# Patient Record
Sex: FEMALE | Race: WHITE | Employment: FULL TIME | URBAN - METROPOLITAN AREA
[De-identification: names, ages, dates, MRNs, and addresses within clinical notes are randomized per-mention and may not be internally consistent; named-entity substitution may affect disease eponyms.]

---

## 2017-02-23 ENCOUNTER — APPOINTMENT (OUTPATIENT)
Dept: GENERAL RADIOLOGY | Age: 27
End: 2017-02-23
Attending: NURSE PRACTITIONER
Payer: COMMERCIAL

## 2017-02-23 ENCOUNTER — HOSPITAL ENCOUNTER (OUTPATIENT)
Age: 27
Discharge: HOME | End: 2017-02-23
Payer: COMMERCIAL

## 2017-02-23 VITALS
HEART RATE: 102 BPM | TEMPERATURE: 99 F | SYSTOLIC BLOOD PRESSURE: 136 MMHG | WEIGHT: 165 LBS | RESPIRATION RATE: 20 BRPM | OXYGEN SATURATION: 98 % | HEIGHT: 67 IN | BODY MASS INDEX: 25.9 KG/M2 | DIASTOLIC BLOOD PRESSURE: 84 MMHG

## 2017-02-23 DIAGNOSIS — J18.9 COMMUNITY ACQUIRED PNEUMONIA: Primary | ICD-10-CM

## 2017-02-23 PROCEDURE — 71020 XR CHEST PA + LAT CHEST (CPT=71020): CPT

## 2017-02-23 PROCEDURE — 99214 OFFICE O/P EST MOD 30 MIN: CPT

## 2017-02-23 PROCEDURE — 99213 OFFICE O/P EST LOW 20 MIN: CPT

## 2017-02-23 RX ORDER — CODEINE PHOSPHATE AND GUAIFENESIN 10; 100 MG/5ML; MG/5ML
5 SOLUTION ORAL EVERY 6 HOURS PRN
Qty: 118 ML | Refills: 0 | Status: SHIPPED | OUTPATIENT
Start: 2017-02-23 | End: 2017-02-28

## 2017-02-23 RX ORDER — AZITHROMYCIN 250 MG/1
TABLET, FILM COATED ORAL
Qty: 1 PACKAGE | Refills: 0 | Status: SHIPPED | OUTPATIENT
Start: 2017-02-23 | End: 2017-02-28

## 2017-02-23 NOTE — ED PROVIDER NOTES
Patient Seen in: [facility] In [place]    History   CC: cough  HPI: Eva SILVERMAN 32year old female  who presents to the ER c/o cough, low-grade fever, generalized fatigue and myalgias, midsternal chest discomfort assoc noted, no periorbital edema  ENT - EAC bilaterally without discharge, TM pearly grey with COL visualized appropriately bilaterally.    No pain with palpation to frontal or maxillary sinuses, + clear nasal drainage noted in nares bilat, no cobblestoning to p

## 2017-02-23 NOTE — ED INITIAL ASSESSMENT (HPI)
REPORTS COUGH STARTING Monday, WHICH HAS GOTTEN PROGRESSIVELY WORSE.  + FEVER, CHILLS, BODY ACHES AND NAUSEA. REPORTS MID STERNAL \"PRESSURE. \"  REPORTS COUGH IS PRODUCTIVE WITH GREEN SPUTUM. DENIES SINUS PRESSURE.

## 2017-09-08 ENCOUNTER — CHARTING TRANS (OUTPATIENT)
Dept: OTHER | Age: 27
End: 2017-09-08

## 2017-09-10 ENCOUNTER — CHARTING TRANS (OUTPATIENT)
Dept: OTHER | Age: 27
End: 2017-09-10

## 2017-12-23 ENCOUNTER — HOSPITAL ENCOUNTER (OUTPATIENT)
Age: 27
Discharge: HOME | End: 2017-12-23
Attending: EMERGENCY MEDICINE
Payer: COMMERCIAL

## 2017-12-23 VITALS
BODY MASS INDEX: 24.46 KG/M2 | OXYGEN SATURATION: 100 % | SYSTOLIC BLOOD PRESSURE: 121 MMHG | RESPIRATION RATE: 18 BRPM | DIASTOLIC BLOOD PRESSURE: 68 MMHG | HEART RATE: 79 BPM | WEIGHT: 154 LBS | TEMPERATURE: 98 F | HEIGHT: 66.6 IN

## 2017-12-23 DIAGNOSIS — J02.0 STREP PHARYNGITIS: Primary | ICD-10-CM

## 2017-12-23 PROCEDURE — 99213 OFFICE O/P EST LOW 20 MIN: CPT

## 2017-12-23 PROCEDURE — 99214 OFFICE O/P EST MOD 30 MIN: CPT

## 2017-12-23 PROCEDURE — 87430 STREP A AG IA: CPT

## 2017-12-23 RX ORDER — PENICILLIN V POTASSIUM 500 MG/1
500 TABLET ORAL 3 TIMES DAILY
Qty: 30 TABLET | Refills: 0 | Status: SHIPPED | OUTPATIENT
Start: 2017-12-23 | End: 2018-01-02

## 2017-12-23 NOTE — ED PROVIDER NOTES
Patient Seen in: [facility]    History   Patient presents with:  Sore Throat    Stated Complaint: Sore Throat    HPI    Patient is a 49-year-old female who presents to the urgent care with a chief complaint of sore throat RAPID STREP - Abnormal; Notable for the following:        Result Value    POCT Rapid Strep Positive (*)     All other components within normal limits       ED Course as of Dec 23 1111  ------------------------------------------------------------       MDM

## 2017-12-23 NOTE — ED INITIAL ASSESSMENT (HPI)
Reports bilateral ear pain and throat pain x 2 days. Denies fevers at home. States throat pain has somewhat improved but ear pain remains. Also reports bilateral muffled hearing.

## 2018-04-17 ENCOUNTER — HOSPITAL ENCOUNTER (EMERGENCY)
Facility: HOSPITAL | Age: 28
Discharge: HOME | End: 2018-04-17
Attending: EMERGENCY MEDICINE
Payer: COMMERCIAL

## 2018-04-17 VITALS
RESPIRATION RATE: 18 BRPM | OXYGEN SATURATION: 100 % | TEMPERATURE: 98 F | WEIGHT: 147 LBS | HEART RATE: 81 BPM | BODY MASS INDEX: 23.07 KG/M2 | SYSTOLIC BLOOD PRESSURE: 122 MMHG | HEIGHT: 67 IN | DIASTOLIC BLOOD PRESSURE: 71 MMHG

## 2018-04-17 DIAGNOSIS — R10.84 ABDOMINAL PAIN, GENERALIZED: Primary | ICD-10-CM

## 2018-04-17 PROCEDURE — 81001 URINALYSIS AUTO W/SCOPE: CPT | Performed by: EMERGENCY MEDICINE

## 2018-04-17 PROCEDURE — 85025 COMPLETE CBC W/AUTO DIFF WBC: CPT | Performed by: EMERGENCY MEDICINE

## 2018-04-17 PROCEDURE — 80048 BASIC METABOLIC PNL TOTAL CA: CPT | Performed by: EMERGENCY MEDICINE

## 2018-04-17 PROCEDURE — 36415 COLL VENOUS BLD VENIPUNCTURE: CPT

## 2018-04-17 PROCEDURE — 99283 EMERGENCY DEPT VISIT LOW MDM: CPT

## 2018-04-17 RX ORDER — DICYCLOMINE HCL 20 MG
20 TABLET ORAL 4 TIMES DAILY PRN
Qty: 30 TABLET | Refills: 0 | Status: SHIPPED | OUTPATIENT
Start: 2018-04-17 | End: 2019-10-28 | Stop reason: ALTCHOICE

## 2018-04-18 NOTE — ED INITIAL ASSESSMENT (HPI)
llq pain then throughout x 3 wk as well as upper abd bloating. + nausea, normal br habits but states loss of appetite

## 2018-04-18 NOTE — ED NOTES
Patient present with her  c/o LLQ pain noticed for 2-3 weeks with increased pain today. Patients admits to vaginal spotting. Pt denies any painful urination, frequency and urgency, no vomiting, and no fever. Positive nausea with pain.  Patient is niecy

## 2018-04-18 NOTE — ED PROVIDER NOTES
Patient Seen in: [facility] Emergency Department    History   Patient presents with:  Abdomen/Flank Pain (GI/)      HPI    Patient presents to the ED complaining of generalized abdominal pain over the past several months.   She states pain occasi appears well-developed and well-nourished. No distress. HENT:   Head: Normocephalic and atraumatic. Eyes: Conjunctivae are normal. Right eye exhibits no discharge. Left eye exhibits no discharge. Neck: No tracheal deviation present.    Cardiovascular: GOLD   RAINBOW DRAW LAVENDER TALL (BNP)         Imaging Results Available and Reviewed while in ED:     ED Medications Administered: Medications - No data to display      [facility]      04/17/18 1920 04/17/18 1922 04/17/18 2055   BP:  136/68 122/71   Pulse: 8 medications    Dicyclomine HCl 20 MG Oral Tab  Take 1 tablet (20 mg total) by mouth 4 (four) times daily as needed (Abdominal pain). , Print Script, Disp-30 tablet, R-0

## 2018-05-14 PROBLEM — R14.0 ABDOMINAL BLOATING: Status: ACTIVE | Noted: 2018-05-14

## 2018-05-14 PROBLEM — R10.32 LLQ PAIN: Status: ACTIVE | Noted: 2018-05-14

## 2018-05-14 PROCEDURE — 82784 ASSAY IGA/IGD/IGG/IGM EACH: CPT | Performed by: INTERNAL MEDICINE

## 2018-05-14 PROCEDURE — 36415 COLL VENOUS BLD VENIPUNCTURE: CPT | Performed by: INTERNAL MEDICINE

## 2018-05-14 PROCEDURE — 83516 IMMUNOASSAY NONANTIBODY: CPT | Performed by: INTERNAL MEDICINE

## 2018-11-03 VITALS
TEMPERATURE: 99.3 F | BODY MASS INDEX: 26.07 KG/M2 | OXYGEN SATURATION: 98 % | DIASTOLIC BLOOD PRESSURE: 70 MMHG | RESPIRATION RATE: 18 BRPM | WEIGHT: 166.1 LBS | SYSTOLIC BLOOD PRESSURE: 110 MMHG | HEART RATE: 85 BPM | HEIGHT: 67 IN

## 2018-12-18 ENCOUNTER — WALK IN (OUTPATIENT)
Dept: URGENT CARE | Age: 28
End: 2018-12-18

## 2018-12-18 ENCOUNTER — TELEPHONE (OUTPATIENT)
Dept: SCHEDULING | Age: 28
End: 2018-12-18

## 2018-12-18 VITALS
BODY MASS INDEX: 23.77 KG/M2 | RESPIRATION RATE: 16 BRPM | HEIGHT: 67 IN | WEIGHT: 151.46 LBS | HEART RATE: 76 BPM | DIASTOLIC BLOOD PRESSURE: 70 MMHG | SYSTOLIC BLOOD PRESSURE: 100 MMHG

## 2018-12-18 DIAGNOSIS — Z02.1 PRE-EMPLOYMENT EXAMINATION: Primary | ICD-10-CM

## 2018-12-18 PROCEDURE — X0945 SELF PAY APN OR PA PERFORMED ADMINISTRATIVE PHYSICAL: HCPCS | Performed by: NURSE PRACTITIONER

## 2019-01-23 ENCOUNTER — HOSPITAL ENCOUNTER (OUTPATIENT)
Age: 29
Discharge: HOME | End: 2019-01-23
Attending: EMERGENCY MEDICINE
Payer: COMMERCIAL

## 2019-01-23 VITALS
WEIGHT: 150 LBS | HEART RATE: 78 BPM | BODY MASS INDEX: 24 KG/M2 | DIASTOLIC BLOOD PRESSURE: 78 MMHG | TEMPERATURE: 98 F | SYSTOLIC BLOOD PRESSURE: 129 MMHG | OXYGEN SATURATION: 99 % | RESPIRATION RATE: 18 BRPM

## 2019-01-23 DIAGNOSIS — J02.0 STREPTOCOCCAL SORE THROAT: Primary | ICD-10-CM

## 2019-01-23 LAB — S PYO AG THROAT QL: POSITIVE

## 2019-01-23 PROCEDURE — 99214 OFFICE O/P EST MOD 30 MIN: CPT

## 2019-01-23 PROCEDURE — 87430 STREP A AG IA: CPT

## 2019-01-23 PROCEDURE — 99213 OFFICE O/P EST LOW 20 MIN: CPT

## 2019-01-23 RX ORDER — AMOXICILLIN 875 MG/1
875 TABLET, COATED ORAL 2 TIMES DAILY
Qty: 20 TABLET | Refills: 0 | Status: SHIPPED | OUTPATIENT
Start: 2019-01-23 | End: 2019-02-02

## 2019-01-23 NOTE — ED PROVIDER NOTES
Patient Seen in: 5 [facility]    History   Patient presents with:  Sore Throat    Stated Complaint: Sore Throat/Ears    HPI    The patient is a 59-year-old female with no significant past medical history presents now with s ×3.  The patient's motor strength is 5 out of 5 and symmetric in the upper and lower extremities bilaterally  Extremities: No focal swelling or tenderness  Skin: No pallor, no redness or warmth to the touch      ED Course     Labs Reviewed   EMH POCT RAPID

## 2019-05-24 ENCOUNTER — HOSPITAL ENCOUNTER (OUTPATIENT)
Age: 29
Discharge: HOME | End: 2019-05-24
Payer: COMMERCIAL

## 2019-05-24 VITALS
BODY MASS INDEX: 24.91 KG/M2 | SYSTOLIC BLOOD PRESSURE: 126 MMHG | DIASTOLIC BLOOD PRESSURE: 61 MMHG | HEIGHT: 66 IN | TEMPERATURE: 98 F | RESPIRATION RATE: 18 BRPM | WEIGHT: 155 LBS | HEART RATE: 62 BPM | OXYGEN SATURATION: 100 %

## 2019-05-24 DIAGNOSIS — H65.91 RIGHT NON-SUPPURATIVE OTITIS MEDIA: Primary | ICD-10-CM

## 2019-05-24 DIAGNOSIS — H65.193 ACUTE MEE (MIDDLE EAR EFFUSION), BILATERAL: ICD-10-CM

## 2019-05-24 PROCEDURE — 99213 OFFICE O/P EST LOW 20 MIN: CPT

## 2019-05-24 PROCEDURE — 99214 OFFICE O/P EST MOD 30 MIN: CPT

## 2019-05-24 RX ORDER — AMOXICILLIN 875 MG/1
875 TABLET, COATED ORAL 2 TIMES DAILY
Qty: 20 TABLET | Refills: 0 | Status: SHIPPED | OUTPATIENT
Start: 2019-05-24 | End: 2019-05-24

## 2019-05-24 RX ORDER — AMOXICILLIN 875 MG/1
875 TABLET, COATED ORAL 2 TIMES DAILY
Qty: 20 TABLET | Refills: 0 | Status: SHIPPED | OUTPATIENT
Start: 2019-05-24 | End: 2019-06-03

## 2019-05-24 RX ORDER — FLUCONAZOLE 150 MG/1
150 TABLET ORAL ONCE
Qty: 1 TABLET | Refills: 0 | Status: SHIPPED | OUTPATIENT
Start: 2019-05-24 | End: 2019-05-24

## 2019-05-24 NOTE — ED PROVIDER NOTES
Patient presents with:  Ear Problem Pain (neurosensory)      HPI:     Grayson Walsh is a 34year old female who presents with a chief complaint of bilateral ear fullness, discomfort, and a buzzing sound in the ears for the past couple weeks.   She state phone: Not on file        Gets together: Not on file        Attends Congregation service: Not on file        Active member of club or organization: Not on file        Attends meetings of clubs or organizations: Not on file        Relationship status: Not on f Refill:  0      Labs performed this visit:  No results found for this or any previous visit (from the past 10 hour(s)). MDM:  I will treat the patient for an otitis media with amoxicillin.   She states she tends to get a yeast vaginitis with antibi

## 2019-11-24 ENCOUNTER — HOSPITAL ENCOUNTER (OUTPATIENT)
Age: 29
Discharge: HOME | End: 2019-11-24
Payer: COMMERCIAL

## 2019-11-24 VITALS
RESPIRATION RATE: 16 BRPM | TEMPERATURE: 97 F | BODY MASS INDEX: 24 KG/M2 | DIASTOLIC BLOOD PRESSURE: 71 MMHG | HEART RATE: 93 BPM | SYSTOLIC BLOOD PRESSURE: 128 MMHG | WEIGHT: 150 LBS | OXYGEN SATURATION: 98 %

## 2019-11-24 DIAGNOSIS — J02.9 VIRAL PHARYNGITIS: Primary | ICD-10-CM

## 2019-11-24 PROCEDURE — 87430 STREP A AG IA: CPT

## 2019-11-24 PROCEDURE — 99212 OFFICE O/P EST SF 10 MIN: CPT

## 2019-11-24 PROCEDURE — 99213 OFFICE O/P EST LOW 20 MIN: CPT

## 2019-11-24 NOTE — ED INITIAL ASSESSMENT (HPI)
PATIENT ARRIVED AMBULATORY TO ROOM C/O SYMPTOMS X3 DAYS. +SORE THROAT. +INTERMITTENT DIARRHEA. +LEFT EAR PAIN. NO NASAL CONGESTION. NO COUGH. EASY NON LABORED RESPIRATIONS.  PATIENT STATES HER TEMP WAS 99 YESTERDAY

## 2019-11-24 NOTE — ED PROVIDER NOTES
Patient presents with:  Sore Throat      HPI:     Marti Jones is a 34year old female who presents for evaluation of a chief complaint of sore throat for the past 2 days. No difficulty swallowing. Speech is clear.   No difficulty breathing or strido of current or ex partner: Not on file        Emotionally abused: Not on file        Physically abused: Not on file        Forced sexual activity: Not on file    Other Topics      Concerns:        Not on file    Social History Narrative      Not on file

## 2020-05-04 PROBLEM — O30.001 TWIN GESTATION IN FIRST TRIMESTER: Status: ACTIVE | Noted: 2020-05-04

## 2020-05-04 PROBLEM — O30.001 TWIN GESTATION IN FIRST TRIMESTER (HCC): Status: ACTIVE | Noted: 2020-05-04

## 2020-07-28 PROBLEM — O30.049 DICHORIONIC DIAMNIOTIC TWIN PREGNANCY, ANTEPARTUM: Status: ACTIVE | Noted: 2020-07-28

## 2020-07-28 PROBLEM — O30.049 DICHORIONIC DIAMNIOTIC TWIN PREGNANCY, ANTEPARTUM (HCC): Status: ACTIVE | Noted: 2020-07-28

## 2020-09-12 ENCOUNTER — HOSPITAL ENCOUNTER (EMERGENCY)
Facility: HOSPITAL | Age: 30
Discharge: HOME | End: 2020-09-12
Payer: COMMERCIAL

## 2020-09-12 ENCOUNTER — HOSPITAL ENCOUNTER (OUTPATIENT)
Facility: HOSPITAL | Age: 30
Setting detail: OBSERVATION
Discharge: HOME | End: 2020-09-13
Attending: OBSTETRICS & GYNECOLOGY | Admitting: OBSTETRICS & GYNECOLOGY
Payer: COMMERCIAL

## 2020-09-12 VITALS
OXYGEN SATURATION: 96 % | TEMPERATURE: 98 F | HEART RATE: 100 BPM | SYSTOLIC BLOOD PRESSURE: 125 MMHG | WEIGHT: 175 LBS | RESPIRATION RATE: 18 BRPM | BODY MASS INDEX: 28.13 KG/M2 | HEIGHT: 66 IN | DIASTOLIC BLOOD PRESSURE: 56 MMHG

## 2020-09-12 VITALS
HEART RATE: 95 BPM | DIASTOLIC BLOOD PRESSURE: 78 MMHG | RESPIRATION RATE: 16 BRPM | TEMPERATURE: 98 F | SYSTOLIC BLOOD PRESSURE: 119 MMHG

## 2020-09-12 DIAGNOSIS — K59.00 CONSTIPATION, UNSPECIFIED CONSTIPATION TYPE: Primary | ICD-10-CM

## 2020-09-12 PROCEDURE — 99283 EMERGENCY DEPT VISIT LOW MDM: CPT

## 2020-09-12 RX ORDER — DEXTROSE, SODIUM CHLORIDE, SODIUM LACTATE, POTASSIUM CHLORIDE, AND CALCIUM CHLORIDE 5; .6; .31; .03; .02 G/100ML; G/100ML; G/100ML; G/100ML; G/100ML
INJECTION, SOLUTION INTRAVENOUS CONTINUOUS
Status: DISCONTINUED | OUTPATIENT
Start: 2020-09-12 | End: 2020-09-13

## 2020-09-12 RX ORDER — PRENATAL VIT/IRON FUM/FOLIC AC 27MG-0.8MG
1 TABLET ORAL DAILY
COMMUNITY
End: 2022-01-26

## 2020-09-12 RX ORDER — POLYETHYLENE GLYCOL 3350 17 G/17G
17 POWDER, FOR SOLUTION ORAL DAILY
Status: ON HOLD | COMMUNITY
End: 2020-12-07

## 2020-09-12 RX ORDER — BISACODYL 10 MG
10 SUPPOSITORY, RECTAL RECTAL
Status: DISCONTINUED | OUTPATIENT
Start: 2020-09-12 | End: 2020-09-13

## 2020-09-12 NOTE — ED INITIAL ASSESSMENT (HPI)
Patient presents to ER with c/o constipation. Patient is approximately 27 weeks pregnant with twins. Last BM was 5 days ago. Denies vaginal bleeding or discharge. Jewell babies move last at [phone].

## 2020-09-13 PROCEDURE — 96360 HYDRATION IV INFUSION INIT: CPT

## 2020-09-13 PROCEDURE — 99214 OFFICE O/P EST MOD 30 MIN: CPT

## 2020-09-13 NOTE — ED NOTES
Pt  is 27 wks gestation with twins.  has not had a BM for at least 5 days, can't remember when. States called her OBGYN on call and told to try Miralax, colace and dulcolax suppository. States no results.  States has been trying so much has swel

## 2020-09-13 NOTE — PROGRESS NOTES
Pt is a 27year old female admitted to TR3/TR3-A. No chief complaint on file. Pt is  26w6d intra-uterine pregnancy. History obtained, consents signed. Oriented to room, staff, and plan of care.

## 2020-09-13 NOTE — PROGRESS NOTES
Discharged to home per ambulatory in stable condition with written and verbal instructions. Patient verbalizes understanding of information given. Discharged to home per ambulatory in stable condition with written and verbal instructions.  Patient verbalize

## 2020-09-13 NOTE — H&P
[place] [facility]    OB Triage Note    Stacy Gomez Patient Status:  Observation    3/15/1990 MRN [id number]   Location [address]  Attending Qi Gonzalez MD   Hosp Day # 0 PCP None Pcp     Date of Admission: Tab, Take 1 tablet by mouth daily. , Disp: , Rfl: , 9/11/2020 at 0900  docusate sodium 50 MG Oral Cap, Take 50 mg by mouth 2 (two) times daily. , Disp: , Rfl: , 9/12/2020 at 1700  PEG 3350 17 g Oral Powd Pack, Take 17 g by mouth daily. , Disp: , Rfl:   Clinda

## 2020-09-13 NOTE — ED PROVIDER NOTES
Patient Seen in: [facility] AND [facility] Emergency Department    History   CC: constipation  HPI: Charisse Atkinsonll 27year old female  who presents c/o constipation throughout her pregnancy. 27 weeks gestation with twins per patient.   Has normally been [place] 98.4 °F (36.9 °C)   Temp src Oral   SpO2 95 %   O2 Device None (Room air)       Current:/55   Pulse 109   Temp 98.4 °F (36.9 °C) (Oral)   Resp 18   Ht 167.6 cm (5' 6\")   Wt 79.4 kg   LMP 03/08/2020   SpO2 95%   BMI 28.25 kg/m²         PE:  General -

## 2020-10-30 ENCOUNTER — HOSPITAL ENCOUNTER (OUTPATIENT)
Facility: HOSPITAL | Age: 30
Setting detail: OBSERVATION
Discharge: HOME | End: 2020-10-30
Attending: OBSTETRICS & GYNECOLOGY | Admitting: OBSTETRICS & GYNECOLOGY
Payer: COMMERCIAL

## 2020-10-30 VITALS
TEMPERATURE: 99 F | RESPIRATION RATE: 16 BRPM | SYSTOLIC BLOOD PRESSURE: 124 MMHG | DIASTOLIC BLOOD PRESSURE: 72 MMHG | HEART RATE: 98 BPM

## 2020-10-30 PROBLEM — O36.8190 DECREASED FETAL MOVEMENT (HCC): Status: ACTIVE | Noted: 2020-10-30

## 2020-10-30 PROBLEM — O36.8190 DECREASED FETAL MOVEMENT: Status: ACTIVE | Noted: 2020-10-30

## 2020-10-30 PROCEDURE — 59025 FETAL NON-STRESS TEST: CPT

## 2020-10-30 PROCEDURE — 99212 OFFICE O/P EST SF 10 MIN: CPT

## 2020-10-30 NOTE — PROGRESS NOTES
Pt is a 27year old female admitted to TR4/TR4-A. Patient presents with:  Decreased Fetal Movement     Pt is  33w5d intra-uterine pregnancy. History obtained, consents signed. Oriented to room, staff, and plan of care.

## 2020-11-27 ENCOUNTER — TELEPHONE (OUTPATIENT)
Dept: OBGYN UNIT | Facility: HOSPITAL | Age: 30
End: 2020-11-27

## 2020-11-27 ENCOUNTER — LAB ENCOUNTER (OUTPATIENT)
Dept: LAB | Facility: HOSPITAL | Age: 30
End: 2020-11-27
Attending: OBSTETRICS & GYNECOLOGY
Payer: COMMERCIAL

## 2020-11-27 DIAGNOSIS — Z01.818 PRE-OP TESTING: ICD-10-CM

## 2020-11-27 PROCEDURE — 86900 BLOOD TYPING SEROLOGIC ABO: CPT

## 2020-11-27 PROCEDURE — 86850 RBC ANTIBODY SCREEN: CPT

## 2020-11-27 PROCEDURE — 36415 COLL VENOUS BLD VENIPUNCTURE: CPT

## 2020-11-27 PROCEDURE — 85025 COMPLETE CBC W/AUTO DIFF WBC: CPT

## 2020-11-27 PROCEDURE — 86901 BLOOD TYPING SEROLOGIC RH(D): CPT

## 2020-11-27 NOTE — H&P (VIEW-ONLY)
Maribel Guerrier is a 27year old female. Patient's last menstrual period was 2020. HPI:    Patient here for  secondary to term twin pregnancy.      Allergies:    Cinnamon                  Sulfa Antibiotics          Current Meds:  Mallorie Ortega apparent distress  HEENT: normocephalic; atraumatic  RESPIRATORY: clear to auscultation  CARDIOVASCULAR: S1, S2 normal, RRR  ABDOMEN: Soft, nondistended; nontender  EXTREMITIES:  non tender without edema  NEUROLOGIC: intact  PSYCHIATRIC: alert and oriented

## 2020-11-30 ENCOUNTER — ANESTHESIA (OUTPATIENT)
Dept: OBGYN UNIT | Facility: HOSPITAL | Age: 30
End: 2020-11-30
Payer: COMMERCIAL

## 2020-11-30 ENCOUNTER — HOSPITAL ENCOUNTER (INPATIENT)
Facility: HOSPITAL | Age: 30
LOS: 3 days | Discharge: HOME | End: 2020-12-03
Attending: OBSTETRICS & GYNECOLOGY | Admitting: OBSTETRICS & GYNECOLOGY
Payer: COMMERCIAL

## 2020-11-30 ENCOUNTER — ANESTHESIA EVENT (OUTPATIENT)
Dept: OBGYN UNIT | Facility: HOSPITAL | Age: 30
End: 2020-11-30
Payer: COMMERCIAL

## 2020-11-30 PROBLEM — O30.009 TWIN DELIVERY BY C-SECTION: Status: ACTIVE | Noted: 2020-11-30

## 2020-11-30 PROBLEM — O30.009 TWIN DELIVERY BY C-SECTION (HCC): Status: ACTIVE | Noted: 2020-11-30

## 2020-11-30 PROCEDURE — S0028 INJECTION, FAMOTIDINE, 20 MG: HCPCS

## 2020-11-30 PROCEDURE — 88307 TISSUE EXAM BY PATHOLOGIST: CPT | Performed by: OBSTETRICS & GYNECOLOGY

## 2020-11-30 RX ORDER — METOCLOPRAMIDE HYDROCHLORIDE 5 MG/ML
INJECTION INTRAMUSCULAR; INTRAVENOUS
Status: COMPLETED
Start: 2020-11-30 | End: 2020-11-30

## 2020-11-30 RX ORDER — ACETAMINOPHEN 325 MG/1
650 TABLET ORAL EVERY 6 HOURS PRN
Status: ACTIVE | OUTPATIENT
Start: 2020-11-30 | End: 2020-12-01

## 2020-11-30 RX ORDER — FAMOTIDINE 10 MG/ML
INJECTION, SOLUTION INTRAVENOUS
Status: COMPLETED
Start: 2020-11-30 | End: 2020-11-30

## 2020-11-30 RX ORDER — SIMETHICONE 80 MG
80 TABLET,CHEWABLE ORAL 3 TIMES DAILY PRN
Status: DISCONTINUED | OUTPATIENT
Start: 2020-11-30 | End: 2020-12-03

## 2020-11-30 RX ORDER — DIPHENHYDRAMINE HYDROCHLORIDE 50 MG/ML
25 INJECTION INTRAMUSCULAR; INTRAVENOUS ONCE AS NEEDED
Status: ACTIVE | OUTPATIENT
Start: 2020-11-30 | End: 2020-11-30

## 2020-11-30 RX ORDER — ASPIRIN 81 MG/1
81 TABLET, CHEWABLE ORAL DAILY
Status: ON HOLD | COMMUNITY
End: 2020-12-03

## 2020-11-30 RX ORDER — HYDROMORPHONE HYDROCHLORIDE 1 MG/ML
0.6 INJECTION, SOLUTION INTRAMUSCULAR; INTRAVENOUS; SUBCUTANEOUS
Status: ACTIVE | OUTPATIENT
Start: 2020-11-30 | End: 2020-12-01

## 2020-11-30 RX ORDER — FAMOTIDINE 10 MG/ML
20 INJECTION, SOLUTION INTRAVENOUS 2 TIMES DAILY
Status: DISCONTINUED | OUTPATIENT
Start: 2020-11-30 | End: 2020-11-30

## 2020-11-30 RX ORDER — HYDROCODONE BITARTRATE AND ACETAMINOPHEN 7.5; 325 MG/1; MG/1
2 TABLET ORAL EVERY 6 HOURS PRN
Status: ACTIVE | OUTPATIENT
Start: 2020-11-30 | End: 2020-12-01

## 2020-11-30 RX ORDER — SODIUM PHOSPHATE, DIBASIC AND SODIUM PHOSPHATE, MONOBASIC 7; 19 G/133ML; G/133ML
1 ENEMA RECTAL ONCE AS NEEDED
Status: DISCONTINUED | OUTPATIENT
Start: 2020-11-30 | End: 2020-12-03

## 2020-11-30 RX ORDER — DIPHENHYDRAMINE HYDROCHLORIDE 50 MG/ML
12.5 INJECTION INTRAMUSCULAR; INTRAVENOUS EVERY 4 HOURS PRN
Status: DISCONTINUED | OUTPATIENT
Start: 2020-11-30 | End: 2020-12-01

## 2020-11-30 RX ORDER — DEXAMETHASONE SODIUM PHOSPHATE 4 MG/ML
VIAL (ML) INJECTION AS NEEDED
Status: DISCONTINUED | OUTPATIENT
Start: 2020-11-30 | End: 2020-11-30 | Stop reason: SURG

## 2020-11-30 RX ORDER — HYDROCODONE BITARTRATE AND ACETAMINOPHEN 7.5; 325 MG/1; MG/1
1 TABLET ORAL EVERY 6 HOURS PRN
Status: ACTIVE | OUTPATIENT
Start: 2020-11-30 | End: 2020-12-01

## 2020-11-30 RX ORDER — METOCLOPRAMIDE HYDROCHLORIDE 5 MG/ML
10 INJECTION INTRAMUSCULAR; INTRAVENOUS EVERY 6 HOURS PRN
Status: DISCONTINUED | OUTPATIENT
Start: 2020-11-30 | End: 2020-11-30

## 2020-11-30 RX ORDER — ONDANSETRON 2 MG/ML
INJECTION INTRAMUSCULAR; INTRAVENOUS AS NEEDED
Status: DISCONTINUED | OUTPATIENT
Start: 2020-11-30 | End: 2020-11-30 | Stop reason: SURG

## 2020-11-30 RX ORDER — ONDANSETRON 2 MG/ML
4 INJECTION INTRAMUSCULAR; INTRAVENOUS EVERY 6 HOURS PRN
Status: DISCONTINUED | OUTPATIENT
Start: 2020-11-30 | End: 2020-11-30

## 2020-11-30 RX ORDER — MORPHINE SULFATE 1 MG/ML
INJECTION, SOLUTION EPIDURAL; INTRATHECAL; INTRAVENOUS AS NEEDED
Status: DISCONTINUED | OUTPATIENT
Start: 2020-11-30 | End: 2020-11-30 | Stop reason: SURG

## 2020-11-30 RX ORDER — HYDROMORPHONE HYDROCHLORIDE 1 MG/ML
0.4 INJECTION, SOLUTION INTRAMUSCULAR; INTRAVENOUS; SUBCUTANEOUS
Status: ACTIVE | OUTPATIENT
Start: 2020-11-30 | End: 2020-12-01

## 2020-11-30 RX ORDER — DIPHENHYDRAMINE HCL 25 MG
25 CAPSULE ORAL EVERY 4 HOURS PRN
Status: DISCONTINUED | OUTPATIENT
Start: 2020-11-30 | End: 2020-12-01

## 2020-11-30 RX ORDER — ACETAMINOPHEN 500 MG
1000 TABLET ORAL EVERY 6 HOURS
Status: DISCONTINUED | OUTPATIENT
Start: 2020-11-30 | End: 2020-12-03

## 2020-11-30 RX ORDER — HYDROMORPHONE HYDROCHLORIDE 1 MG/ML
0.2 INJECTION, SOLUTION INTRAMUSCULAR; INTRAVENOUS; SUBCUTANEOUS EVERY 2 HOUR PRN
Status: DISCONTINUED | OUTPATIENT
Start: 2020-11-30 | End: 2020-12-03

## 2020-11-30 RX ORDER — BUPIVACAINE HYDROCHLORIDE 7.5 MG/ML
INJECTION, SOLUTION INTRASPINAL AS NEEDED
Status: DISCONTINUED | OUTPATIENT
Start: 2020-11-30 | End: 2020-11-30 | Stop reason: SURG

## 2020-11-30 RX ORDER — SODIUM CHLORIDE, SODIUM LACTATE, POTASSIUM CHLORIDE, CALCIUM CHLORIDE 600; 310; 30; 20 MG/100ML; MG/100ML; MG/100ML; MG/100ML
INJECTION, SOLUTION INTRAVENOUS CONTINUOUS
Status: DISCONTINUED | OUTPATIENT
Start: 2020-11-30 | End: 2020-12-03

## 2020-11-30 RX ORDER — NALOXONE HYDROCHLORIDE 0.4 MG/ML
0.08 INJECTION, SOLUTION INTRAMUSCULAR; INTRAVENOUS; SUBCUTANEOUS
Status: ACTIVE | OUTPATIENT
Start: 2020-11-30 | End: 2020-12-01

## 2020-11-30 RX ORDER — LIDOCAINE HYDROCHLORIDE 10 MG/ML
INJECTION, SOLUTION EPIDURAL; INFILTRATION; INTRACAUDAL; PERINEURAL AS NEEDED
Status: DISCONTINUED | OUTPATIENT
Start: 2020-11-30 | End: 2020-11-30 | Stop reason: SURG

## 2020-11-30 RX ORDER — IBUPROFEN 600 MG/1
600 TABLET ORAL EVERY 6 HOURS
Status: DISCONTINUED | OUTPATIENT
Start: 2020-12-01 | End: 2020-11-30

## 2020-11-30 RX ORDER — AMMONIA INHALANTS 0.04 G/.3ML
0.3 INHALANT RESPIRATORY (INHALATION) AS NEEDED
Status: DISCONTINUED | OUTPATIENT
Start: 2020-11-30 | End: 2020-12-03

## 2020-11-30 RX ORDER — ONDANSETRON 2 MG/ML
4 INJECTION INTRAMUSCULAR; INTRAVENOUS ONCE AS NEEDED
Status: ACTIVE | OUTPATIENT
Start: 2020-11-30 | End: 2020-11-30

## 2020-11-30 RX ORDER — SODIUM CHLORIDE, SODIUM LACTATE, POTASSIUM CHLORIDE, CALCIUM CHLORIDE 600; 310; 30; 20 MG/100ML; MG/100ML; MG/100ML; MG/100ML
125 INJECTION, SOLUTION INTRAVENOUS CONTINUOUS
Status: DISCONTINUED | OUTPATIENT
Start: 2020-11-30 | End: 2020-11-30

## 2020-11-30 RX ORDER — IBUPROFEN 600 MG/1
600 TABLET ORAL EVERY 6 HOURS
Status: DISCONTINUED | OUTPATIENT
Start: 2020-12-01 | End: 2020-12-03

## 2020-11-30 RX ORDER — BISACODYL 10 MG
10 SUPPOSITORY, RECTAL RECTAL
Status: DISCONTINUED | OUTPATIENT
Start: 2020-11-30 | End: 2020-12-03

## 2020-11-30 RX ORDER — TRISODIUM CITRATE DIHYDRATE AND CITRIC ACID MONOHYDRATE 500; 334 MG/5ML; MG/5ML
30 SOLUTION ORAL ONCE
Status: COMPLETED | OUTPATIENT
Start: 2020-11-30 | End: 2020-11-30

## 2020-11-30 RX ORDER — KETOROLAC TROMETHAMINE 30 MG/ML
30 INJECTION, SOLUTION INTRAMUSCULAR; INTRAVENOUS ONCE AS NEEDED
Status: COMPLETED | OUTPATIENT
Start: 2020-11-30 | End: 2020-11-30

## 2020-11-30 RX ORDER — CEFAZOLIN SODIUM/WATER 2 G/20 ML
2 SYRINGE (ML) INTRAVENOUS ONCE
Status: COMPLETED | OUTPATIENT
Start: 2020-11-30 | End: 2020-11-30

## 2020-11-30 RX ORDER — DEXTROSE, SODIUM CHLORIDE, SODIUM LACTATE, POTASSIUM CHLORIDE, AND CALCIUM CHLORIDE 5; .6; .31; .03; .02 G/100ML; G/100ML; G/100ML; G/100ML; G/100ML
INJECTION, SOLUTION INTRAVENOUS CONTINUOUS
Status: DISCONTINUED | OUTPATIENT
Start: 2020-11-30 | End: 2020-12-03

## 2020-11-30 RX ORDER — DOCUSATE SODIUM 100 MG/1
100 CAPSULE, LIQUID FILLED ORAL
Status: DISCONTINUED | OUTPATIENT
Start: 2020-11-30 | End: 2020-12-03

## 2020-11-30 RX ORDER — KETOROLAC TROMETHAMINE 30 MG/ML
30 INJECTION, SOLUTION INTRAMUSCULAR; INTRAVENOUS EVERY 6 HOURS
Status: COMPLETED | OUTPATIENT
Start: 2020-11-30 | End: 2020-12-01

## 2020-11-30 RX ORDER — GABAPENTIN 300 MG/1
300 CAPSULE ORAL EVERY 8 HOURS PRN
Status: DISCONTINUED | OUTPATIENT
Start: 2020-11-30 | End: 2020-12-03

## 2020-11-30 RX ORDER — ONDANSETRON 2 MG/ML
4 INJECTION INTRAMUSCULAR; INTRAVENOUS EVERY 6 HOURS PRN
Status: DISCONTINUED | OUTPATIENT
Start: 2020-11-30 | End: 2020-12-03

## 2020-11-30 RX ORDER — CHOLECALCIFEROL (VITAMIN D3) 25 MCG
1 TABLET,CHEWABLE ORAL DAILY
Status: DISCONTINUED | OUTPATIENT
Start: 2020-11-30 | End: 2020-12-03

## 2020-11-30 RX ORDER — HALOPERIDOL 5 MG/ML
0.5 INJECTION INTRAMUSCULAR ONCE AS NEEDED
Status: ACTIVE | OUTPATIENT
Start: 2020-11-30 | End: 2020-11-30

## 2020-11-30 RX ORDER — KETOROLAC TROMETHAMINE 30 MG/ML
30 INJECTION, SOLUTION INTRAMUSCULAR; INTRAVENOUS EVERY 6 HOURS
Status: DISCONTINUED | OUTPATIENT
Start: 2020-11-30 | End: 2020-11-30

## 2020-11-30 RX ORDER — HYDROMORPHONE HYDROCHLORIDE 2 MG/1
2 TABLET ORAL EVERY 4 HOURS PRN
Status: DISCONTINUED | OUTPATIENT
Start: 2020-11-30 | End: 2020-12-03

## 2020-11-30 RX ORDER — ACETAMINOPHEN 500 MG
1000 TABLET ORAL ONCE
Status: COMPLETED | OUTPATIENT
Start: 2020-11-30 | End: 2020-11-30

## 2020-11-30 RX ORDER — PROCHLORPERAZINE EDISYLATE 5 MG/ML
5 INJECTION INTRAMUSCULAR; INTRAVENOUS ONCE AS NEEDED
Status: ACTIVE | OUTPATIENT
Start: 2020-11-30 | End: 2020-11-30

## 2020-11-30 RX ORDER — POLYETHYLENE GLYCOL 3350 17 G/17G
17 POWDER, FOR SOLUTION ORAL DAILY PRN
Status: DISCONTINUED | OUTPATIENT
Start: 2020-11-30 | End: 2020-12-03

## 2020-11-30 RX ADMIN — CEFAZOLIN SODIUM/WATER 2 G: 2 G/20 ML SYRINGE (ML) INTRAVENOUS at 07:48:00

## 2020-11-30 RX ADMIN — BUPIVACAINE HYDROCHLORIDE 1.6 ML: 7.5 INJECTION, SOLUTION INTRASPINAL at 07:45:00

## 2020-11-30 RX ADMIN — ONDANSETRON 4 MG: 2 INJECTION INTRAMUSCULAR; INTRAVENOUS at 08:08:00

## 2020-11-30 RX ADMIN — SODIUM CHLORIDE, SODIUM LACTATE, POTASSIUM CHLORIDE, CALCIUM CHLORIDE: 600; 310; 30; 20 INJECTION, SOLUTION INTRAVENOUS at 07:44:00

## 2020-11-30 RX ADMIN — DEXAMETHASONE SODIUM PHOSPHATE 4 MG: 4 MG/ML VIAL (ML) INJECTION at 08:08:00

## 2020-11-30 RX ADMIN — MORPHINE SULFATE 0.2 MG: 1 INJECTION, SOLUTION EPIDURAL; INTRATHECAL; INTRAVENOUS at 07:45:00

## 2020-11-30 RX ADMIN — SODIUM CHLORIDE, SODIUM LACTATE, POTASSIUM CHLORIDE, CALCIUM CHLORIDE: 600; 310; 30; 20 INJECTION, SOLUTION INTRAVENOUS at 08:39:00

## 2020-11-30 RX ADMIN — LIDOCAINE HYDROCHLORIDE 3 ML: 10 INJECTION, SOLUTION EPIDURAL; INFILTRATION; INTRACAUDAL; PERINEURAL at 07:44:00

## 2020-11-30 RX ADMIN — SODIUM CHLORIDE, SODIUM LACTATE, POTASSIUM CHLORIDE, CALCIUM CHLORIDE: 600; 310; 30; 20 INJECTION, SOLUTION INTRAVENOUS at 08:01:00

## 2020-11-30 NOTE — INTERVAL H&P NOTE
Pre-op Diagnosis: Primary twins    The above referenced H&P was reviewed by Sam Kaye MD on 11/30/2020, the patient was examined and no significant changes have occurred in the patient's condition since the H&P was performed.   I discussed with the pa

## 2020-11-30 NOTE — ANESTHESIA PREPROCEDURE EVALUATION
Anesthesia PreOp Note    HPI:     Diana Waller is a 27year old female who presents for preoperative consultation requested by: Corin Burleson MD    Date of Surgery: 2020    Procedure(s):   SECTION  Indication: Primary twins    Gaila Courser Intravenous, Continuous, Ladkris OTERO MD, Last Rate: 125 mL/hr at 11/30/20 0630, 125 mL/hr at 11/30/20 0630    •  ondansetron HCl (ZOFRAN) injection 4 mg, 4 mg, Intravenous, Q6H PRN, Artur Rodriguez MD    •  oxyTOCIN (PITOCIN) 30 units/ 500 ml 0.9% NS meetings of clubs or organizations: Not on file        Relationship status: Not on file      Intimate partner violence        Fear of current or ex partner: Not on file        Emotionally abused: Not on file        Physically abused: Not on file        For family member of the nature of the anesthetic plan, benefits, risks including possible dental damage if relevant, major complications, and any alternative forms of anesthetic management.    All of the patient's questions were answered to the best of my abil

## 2020-11-30 NOTE — OPERATIVE REPORT
[facility]    PATIENT'S NAME: Ally Lewis   ATTENDING PHYSICIAN: Natividad Roberts MD   OPERATING PHYSICIAN: Natividad Roberts MD   PATIENT ACCOUNT#:   [id number]    LOCATION:  [address] [place] [address] RECORD #:   [id number]       DATE OF BIRTH:  [birth year] pickups, entered sharply, extended laterally. Bladder flap created digitally. Bladder blade reinserted behind the left bladder flap. Low-transverse uterine incision was made with a scalpel and extended bluntly. Upon entry into the uterus. , 2 feet were

## 2020-11-30 NOTE — ANESTHESIA POSTPROCEDURE EVALUATION
Patient: Ronald Gillis    Procedure Summary     Date: 20 Room / Location: [address] [place] [address] L+D OR  [facility] L+D OR    Anesthesia Start:  Anesthesia Stop: 3287    Procedure:  SECTION (N/A Abdomen) Diagnosis: (same as preop )    Surgeons: Sabi Garzon

## 2020-11-30 NOTE — LACTATION NOTE
LACTATION NOTE - MOTHER      Evaluation Type: Inpatient    Problems identified  Problems identified: Knowledge deficit;Multiple birth    Maternal history  Maternal history: Caesarean section; Infertility    Breastfeeding goal  Breastfeeding goal: To [facility]

## 2020-11-30 NOTE — ANESTHESIA PROCEDURE NOTES
Spinal Block  Performed by: Lety Cronin CRNA  Authorized by: Mariann Hargrove DO       General Information and Staff    Start Time:   Anesthesiologist: Mariann Hargrove DO  CRNA:  Lety Cronin CRNA  Performed by:   Anesthesiologist  Preanesthetic Checkli

## 2020-11-30 NOTE — PROGRESS NOTES
Patient transferred to mother/baby room 362 per cart in stable condition with baby and personal belongings. Accompanied by  and staff. Report given to [facility].

## 2020-11-30 NOTE — L&D DELIVERY NOTE
[facility] - [facility]     Section Delivery Note    Leonetta Homans Patient Status:  Inpatient    3/15/1990 MRN [id number]   Location [address]  Attending Edgar Dubose MD   Hosp Day # 0 PCP None Pcp     Pre Madeleine Brunner Placenta  Date/Time of Delivery: Keenan Tijerina  1 [[id number]]   11/30/2020  8:08 AM  Vishnu, Girl 2 [[id number]]   11/30/2020  8:08 AM   Delivery: manual extraction  Placenta to Pathology: yes  Cord Gases Submitted: no  Cord Blood Collection: no  Cord

## 2020-11-30 NOTE — PLAN OF CARE
Problem: GENITOURINARY - ADULT  Goal: Absence of urinary retention  Description: INTERVENTIONS:  - Assess patient’s ability to void and empty bladder  - Monitor intake/output and perform bladder scan as needed  - Follow urinary retention protocol/standar breast feeding aids (e.g., infant sling, nursing footstool/pillows, and breast pumps). - Encourage mother/other family members to express feelings/concerns, and actively listen.   - Educate father/SO about benefits of breast feeding and how to manage commo position. Side rails up X2. Vital signs stable, fundus firm , lochia small, no clots noted. IV site unremarkable. Pain managed. Babies present in open crib. ID bands matched. Patient and family oriented to unit, room and call light within reach.   Saf

## 2020-12-01 PROCEDURE — 85025 COMPLETE CBC W/AUTO DIFF WBC: CPT | Performed by: OBSTETRICS & GYNECOLOGY

## 2020-12-01 NOTE — PROGRESS NOTES
[place] [facility]    OB/GYNE Progress Note      James Reese Patient Status:  Inpatient    3/15/1990 MRN [id number]   Location [facility] 3SE Attending Irene Ireland MD   Hosp Day # 1 PCP None Pcp       Assessment/Plan     Assess 160.0 12/01/2020    CREATSERUM 0.79 04/17/2018    BUN 8 04/17/2018     04/17/2018    K 3.7 04/17/2018     04/17/2018    CO2 25 04/17/2018    GLU 96 04/17/2018    CA 8.8 04/17/2018       Lab Results   Component Value Date    COLORUR Yellow 04/17

## 2020-12-01 NOTE — LACTATION NOTE
This note was copied from a baby's chart.   LACTATION NOTE - INFANT    Evaluation Type  Evaluation Type: Inpatient    Problems & Assessment  Problems Diagnosed or Identified: Sleepy  Infant Assessment: Hunger cues present  Muscle tone: Appropriate for GA

## 2020-12-01 NOTE — LACTATION NOTE
This note was copied from a baby's chart.   LACTATION NOTE - INFANT    Evaluation Type  Evaluation Type: Inpatient    Problems & Assessment  Problems Diagnosed or Identified: Sleepy  Infant Assessment: Minimal hunger cues present;Skin color: pink or appropr

## 2020-12-01 NOTE — PROGRESS NOTES
[facility] [facility]   Acute Pain Rounds Note  2020    Patient name: Andry Jackson 27year old female  : 3/15/1990  MRN: [id number]    Diagnosis: [unfilled]    S/P: c section    Pain modality: Duramorph    Current hospital day: Hospital Day:

## 2020-12-02 NOTE — LACTATION NOTE
This note was copied from a baby's chart. LACTATION NOTE - INFANT         Problems & Assessment  Problems Diagnosed or Identified: Shallow latch  Problems: comment/detail: infant fussy HE 5cc, formula last night for wt loss and parents sleep.   Infant Asse

## 2020-12-02 NOTE — PROGRESS NOTES
[place] [facility] - [facility]    OB/Gyne Post  Section Progress Note      Ronald Gillis Patient Status:  Inpatient    3/15/1990 MRN [id number]   Location [facility] 3SE Attending Jose Antonio Rehman MD   Hosp Day # 2 PCP None Pcp       Subj

## 2020-12-03 VITALS
DIASTOLIC BLOOD PRESSURE: 73 MMHG | RESPIRATION RATE: 18 BRPM | SYSTOLIC BLOOD PRESSURE: 129 MMHG | OXYGEN SATURATION: 98 % | TEMPERATURE: 99 F | HEART RATE: 102 BPM

## 2020-12-03 RX ORDER — IBUPROFEN 600 MG/1
600 TABLET ORAL EVERY 6 HOURS
Qty: 60 TABLET | Refills: 0 | Status: SHIPPED | OUTPATIENT
Start: 2020-12-03 | End: 2021-11-15

## 2020-12-03 RX ORDER — ACETAMINOPHEN 500 MG
1000 TABLET ORAL EVERY 6 HOURS
Qty: 60 TABLET | Refills: 0 | Status: SHIPPED | OUTPATIENT
Start: 2020-12-03 | End: 2021-09-08

## 2020-12-03 NOTE — PLAN OF CARE
Problem: GENITOURINARY - ADULT  Goal: Absence of urinary retention  Description: INTERVENTIONS:  - Assess patient’s ability to void and empty bladder  - Monitor intake/output and perform bladder scan as needed  - Follow urinary retention protocol/standar breast feeding aids (e.g., infant sling, nursing footstool/pillows, and breast pumps). - Encourage mother/other family members to express feelings/concerns, and actively listen.   - Educate father/SO about benefits of breast feeding and how to manage commo matched with baby band. Patient informed with to  Make follow appointment with ob. Mother is interacting appropriately with baby. Verbalized understanding of follow up instructions. Discharged in stable condition via wheelchair.

## 2020-12-03 NOTE — PROGRESS NOTES
[place] [facility] - [facility]    OB/GYNE Progress Note      Stacylinda Gomez Patient Status:  Inpatient    3/15/1990 MRN [id number]   Location [facility] 3SE Attending Miko Quigley MD   Hosp Day # 3 PCP None Pcp       Assessment/Plan       Asse

## 2020-12-03 NOTE — LACTATION NOTE
This note was copied from a baby's chart.   LACTATION NOTE - INFANT    Evaluation Type  Evaluation Type: Inpatient    Problems & Assessment  Problems Diagnosed or Identified: Sleepy  Problems: comment/detail: wt loss >8%  Infant Assessment: Minimal hunger c

## 2020-12-03 NOTE — DISCHARGE SUMMARY
[place] [facility] - [facility]    Discharge Summary    [facility]:  [facility]    3/15/1990 MRN [id number]   Location [facility] 3SE Attending Austin Martinez MD   [facility] Day # 3 PCP None Pcp     Date of Admission: 2020 GYNECOLOGY  Contact information:  [address] [place] [address] [facility] [address]  12/3/2020

## 2020-12-03 NOTE — PLAN OF CARE
Problem: GENITOURINARY - ADULT  Goal: Absence of urinary retention  Description: INTERVENTIONS:  - Assess patient’s ability to void and empty bladder  - Monitor intake/output and perform bladder scan as needed  - Follow urinary retention protocol/standar breast feeding aids (e.g., infant sling, nursing footstool/pillows, and breast pumps). - Encourage mother/other family members to express feelings/concerns, and actively listen.   - Educate father/SO about benefits of breast feeding and how to manage commo

## 2020-12-03 NOTE — LACTATION NOTE
This note was copied from a baby's chart. LACTATION NOTE - INFANT    Evaluation Type  Evaluation Type: Inpatient    Problems & Assessment  Problems Diagnosed or Identified: Shallow latch; Excessive weight loss  Infant Assessment: Hunger cues present;Skin c

## 2020-12-03 NOTE — LACTATION NOTE
LACTATION NOTE - MOTHER      Evaluation Type: Inpatient    Problems identified  Problems identified: Knowledge deficit;Multiple birth;Nipple pain; Nipple trauma    Maternal history  Maternal history: Caesarean section; Infertility    Breastfeeding goal  [facility]

## 2020-12-03 NOTE — LACTATION NOTE
Mom is pumping and supplementing with EBM/ABM after feeds, c/o nipple soreness, twins are down 8-10%. Going home today, outpatient [facility] visit scheduled for 12/10.

## 2020-12-05 ENCOUNTER — APPOINTMENT (OUTPATIENT)
Dept: CT IMAGING | Facility: HOSPITAL | Age: 30
DRG: 776 | End: 2020-12-05
Attending: EMERGENCY MEDICINE
Payer: COMMERCIAL

## 2020-12-05 ENCOUNTER — HOSPITAL ENCOUNTER (INPATIENT)
Facility: HOSPITAL | Age: 30
LOS: 1 days | Discharge: HOME | DRG: 776 | End: 2020-12-07
Attending: EMERGENCY MEDICINE | Admitting: OBSTETRICS & GYNECOLOGY
Payer: COMMERCIAL

## 2020-12-05 ENCOUNTER — TELEPHONE (OUTPATIENT)
Dept: OBGYN UNIT | Facility: HOSPITAL | Age: 30
End: 2020-12-05

## 2020-12-05 PROCEDURE — 81001 URINALYSIS AUTO W/SCOPE: CPT | Performed by: EMERGENCY MEDICINE

## 2020-12-05 PROCEDURE — 84156 ASSAY OF PROTEIN URINE: CPT | Performed by: EMERGENCY MEDICINE

## 2020-12-05 PROCEDURE — 93010 ELECTROCARDIOGRAM REPORT: CPT | Performed by: EMERGENCY MEDICINE

## 2020-12-05 PROCEDURE — 85025 COMPLETE CBC W/AUTO DIFF WBC: CPT | Performed by: EMERGENCY MEDICINE

## 2020-12-05 PROCEDURE — 99285 EMERGENCY DEPT VISIT HI MDM: CPT

## 2020-12-05 PROCEDURE — 80053 COMPREHEN METABOLIC PANEL: CPT | Performed by: EMERGENCY MEDICINE

## 2020-12-05 PROCEDURE — 82570 ASSAY OF URINE CREATININE: CPT | Performed by: EMERGENCY MEDICINE

## 2020-12-05 PROCEDURE — 93005 ELECTROCARDIOGRAM TRACING: CPT

## 2020-12-05 PROCEDURE — 70450 CT HEAD/BRAIN W/O DYE: CPT | Performed by: EMERGENCY MEDICINE

## 2020-12-05 PROCEDURE — 96374 THER/PROPH/DIAG INJ IV PUSH: CPT

## 2020-12-05 PROCEDURE — 87086 URINE CULTURE/COLONY COUNT: CPT | Performed by: EMERGENCY MEDICINE

## 2020-12-05 PROCEDURE — 83615 LACTATE (LD) (LDH) ENZYME: CPT | Performed by: EMERGENCY MEDICINE

## 2020-12-05 RX ORDER — CALCIUM GLUCONATE 94 MG/ML
1 INJECTION, SOLUTION INTRAVENOUS ONCE AS NEEDED
Status: DISCONTINUED | OUTPATIENT
Start: 2020-12-05 | End: 2020-12-07

## 2020-12-05 RX ORDER — LABETALOL HYDROCHLORIDE 5 MG/ML
10 INJECTION, SOLUTION INTRAVENOUS ONCE
Status: COMPLETED | OUTPATIENT
Start: 2020-12-05 | End: 2020-12-05

## 2020-12-06 ENCOUNTER — ANESTHESIA (OUTPATIENT)
Dept: OBGYN UNIT | Facility: HOSPITAL | Age: 30
DRG: 776 | End: 2020-12-06
Payer: COMMERCIAL

## 2020-12-06 ENCOUNTER — ANESTHESIA EVENT (OUTPATIENT)
Dept: OBGYN UNIT | Facility: HOSPITAL | Age: 30
DRG: 776 | End: 2020-12-06
Payer: COMMERCIAL

## 2020-12-06 PROCEDURE — 82570 ASSAY OF URINE CREATININE: CPT | Performed by: OBSTETRICS & GYNECOLOGY

## 2020-12-06 PROCEDURE — 84156 ASSAY OF PROTEIN URINE: CPT | Performed by: OBSTETRICS & GYNECOLOGY

## 2020-12-06 PROCEDURE — 83735 ASSAY OF MAGNESIUM: CPT | Performed by: OBSTETRICS & GYNECOLOGY

## 2020-12-06 RX ORDER — IBUPROFEN 600 MG/1
TABLET ORAL
Status: COMPLETED
Start: 2020-12-06 | End: 2020-12-06

## 2020-12-06 RX ORDER — ACETAMINOPHEN 500 MG
TABLET ORAL
Status: COMPLETED
Start: 2020-12-06 | End: 2020-12-06

## 2020-12-06 RX ORDER — SODIUM CHLORIDE, SODIUM LACTATE, POTASSIUM CHLORIDE, CALCIUM CHLORIDE 600; 310; 30; 20 MG/100ML; MG/100ML; MG/100ML; MG/100ML
10 INJECTION, SOLUTION INTRAVENOUS CONTINUOUS
Status: DISCONTINUED | OUTPATIENT
Start: 2020-12-06 | End: 2020-12-07

## 2020-12-06 RX ORDER — IBUPROFEN 600 MG/1
600 TABLET ORAL EVERY 6 HOURS PRN
Status: DISCONTINUED | OUTPATIENT
Start: 2020-12-06 | End: 2020-12-07

## 2020-12-06 RX ORDER — SODIUM CHLORIDE, SODIUM LACTATE, POTASSIUM CHLORIDE, CALCIUM CHLORIDE 600; 310; 30; 20 MG/100ML; MG/100ML; MG/100ML; MG/100ML
INJECTION, SOLUTION INTRAVENOUS CONTINUOUS
Status: DISCONTINUED | OUTPATIENT
Start: 2020-12-06 | End: 2020-12-07

## 2020-12-06 RX ORDER — ACETAMINOPHEN 500 MG
1000 TABLET ORAL EVERY 6 HOURS PRN
Status: DISCONTINUED | OUTPATIENT
Start: 2020-12-06 | End: 2020-12-07

## 2020-12-06 RX ORDER — HYDROCODONE BITARTRATE AND ACETAMINOPHEN 5; 325 MG/1; MG/1
1 TABLET ORAL EVERY 6 HOURS PRN
Status: DISCONTINUED | OUTPATIENT
Start: 2020-12-06 | End: 2020-12-07

## 2020-12-06 NOTE — ED NOTES
Orders for admission, patient is aware of plan and ready to go upstairs. Any questions, please call ED RN Sahra Ortega  at extension [phone].    Type of COVID test sent:rapid  COVID Suspicion level: Low    Titratable drug(s) infusing:Mag   Rate:    LOC at time of tr

## 2020-12-06 NOTE — ED NOTES
Report given to Yair Ayoub. Per [facility] RN ok to send pt up with pending COVID test and hold off on mag until pt is upstairs.  [id number] Lilibeth Lockhart with ERMD.

## 2020-12-06 NOTE — H&P
[facility] Patient Status:  Inpatient    3/15/1990 MRN [id number]   Location [address]  Attending Miguel Valencia MD   Hosp Day # 0 PCP None Pcp     Date of Admission mg total) by mouth every 6 (six) hours. , Disp: 60 tablet, Rfl: 0    •  Prenatal 27-0.8 MG Oral Tab, Take 1 tablet by mouth daily. , Disp: , Rfl:     •  docusate sodium 50 MG Oral Cap, Take 50 mg by mouth 2 (two) times daily. , Disp: , Rfl: , [facility] expectations were discussed in detail. All questions answered, all appropriate consents will be signed at the Hospital. Admission is planned for today,Plan 24hrs MgSO4.     Rich Ascencio  12/6/2020  6:45 AM

## 2020-12-06 NOTE — PROGRESS NOTES
Pt is a 27year old female admitted to 373/373-A. Pt delivered 2020 via C/S    Patient presents with:  Headache  Hypertension  R/o Pih     Pt is    History obtained. Pt oriented to room, staff, and plan of care.

## 2020-12-06 NOTE — ED INITIAL ASSESSMENT (HPI)
Pt to er after giving birth via c section on 11/30 to twins. Pt noted today that she was having a HA at home with some eye pain and took her BP at home and it was 160's over 100's. OBGYN told her to come to ER.  Pt did have a couple high readings before d

## 2020-12-06 NOTE — ANESTHESIA PROCEDURE NOTES
Peripheral IV  Date/Time: 12/6/2020 8:44 AM  Inserted by: Kiko Wong MD    Placement  Needle size: 20 G  Laterality: left  Location: hand  Local anesthetic: none  Site prep: alcohol and chlorhexidine  Technique: transillumination  Attempts: 2

## 2020-12-06 NOTE — ED PROVIDER NOTES
Patient Seen in: [facility] Emergency Department      History   Patient presents with:  Headache  Hypertension    Stated Complaint: HA post giving birth on     HPI  54-year-old female postop day 5 from  section for twin delivery, pres Exam  Vitals signs and nursing note reviewed. Constitutional:       Appearance: Normal appearance. She is well-developed. HENT:      Head: Normocephalic and atraumatic. Neck:      Musculoskeletal: Normal range of motion.    Cardiovascular:      Rate a Narrative: The following orders were created for panel order CBC WITH DIFFERENTIAL WITH PLATELET.   Procedure                               Abnormality         Status                     ---------                               -----------         --- postpartum period  (primary encounter diagnosis)    Disposition:  Admit  12/5/2020 11:56 pm    Follow-up:  No follow-up provider specified.         Medications Prescribed:  Current Discharge Medication List                          Present on Admission  Ben

## 2020-12-06 NOTE — LACTATION NOTE
LACTATION NOTE - MOTHER      Evaluation Type: Inpatient    Problems identified  Problems identified: Knowledge deficit;Multiple birth;Nipple pain;Milk supply not WNL; Engorgement    Maternal history  Maternal history: Caesarean section; Infertility;PIH  Othe

## 2020-12-06 NOTE — TELEPHONE ENCOUNTER
Calling with really bad throbbing tound the clock headache. Took BP: elevated 140/90. 150/104    Instructed to come to ED for evaluation. Potential of staying for MgSO4 discussed with patient. Patient verbalized understanding; all questions answered.

## 2020-12-06 NOTE — PROGRESS NOTES
RN notified Dr. Shashank Dial pt's magnesium sulfate, IV fluids, SCD boots, pulse oximeter and tovar catheter have been discontinued. IV heplocked and BP's continue to cycle.  T.O. for pt to be discharged at 5 pm if BP's remain stable and pt voids prior to discha

## 2020-12-07 VITALS
RESPIRATION RATE: 14 BRPM | DIASTOLIC BLOOD PRESSURE: 67 MMHG | HEIGHT: 67 IN | OXYGEN SATURATION: 99 % | HEART RATE: 77 BPM | WEIGHT: 179 LBS | BODY MASS INDEX: 28.09 KG/M2 | TEMPERATURE: 99 F | SYSTOLIC BLOOD PRESSURE: 120 MMHG

## 2020-12-07 PROCEDURE — 85025 COMPLETE CBC W/AUTO DIFF WBC: CPT | Performed by: OBSTETRICS & GYNECOLOGY

## 2020-12-07 PROCEDURE — 83735 ASSAY OF MAGNESIUM: CPT | Performed by: OBSTETRICS & GYNECOLOGY

## 2020-12-07 PROCEDURE — 80053 COMPREHEN METABOLIC PANEL: CPT | Performed by: OBSTETRICS & GYNECOLOGY

## 2020-12-07 NOTE — DISCHARGE PLANNING
Discharge order received from MD. Mom in stable condition. Discharge instructions given regarding preeclampsia signs and symptoms, MD followup 2 days. Patient understands instructions and no further questions.

## 2020-12-07 NOTE — DISCHARGE SUMMARY
[place] [facility] - [facility]    Discharge Summary    Vamshi Mares Patient Status:  Inpatient    3/15/1990 MRN [id number]   Location [address] Attending Olaf Harris MD   [facility] Day # 1 PCP None Pcp     Date of Admission: Activity:  As tolerated, Pelvic rest 5 weeks    Follow up: Follow-up Information     Call [facility] Lactation [facility]. Specialty: [facility]  Why: As needed  Contact information:  [address]   [address] [place] [address]

## 2020-12-07 NOTE — PROGRESS NOTES
T.O. received from Dr. Mary Cuello for RN to decrease magnesium sulfate from 2 grams to 1.5 grams/hr and pt does not need to leave SCD's in place d/t pt's consistent moving and repositioning self in bed.

## 2020-12-08 NOTE — PAYOR COMM NOTE
--------------  ADMISSION REVIEW     Payor: BHAVESH CURRY  Subscriber #:  [id number]  Authorization Number: [id number]    Admit date: 12/6/20  Admit time: 0032       Patient Seen in: [facility] Emergency Department    History   Patient presents with: Motor: Motor function is intact. Coordination: Coordination is intact. Gait: Gait is intact.       Comments: No focal deficits     Labs Reviewed   URINALYSIS WITH CULTURE REFLEX - Abnormal; Notable for the following components:       Result Maite Patient had called on call MD with complaints of severe headache, nausea. BP at home 140-160s/. Instructed to present to ER. In the ER she was noted to have elevated LFTs and BP requiring IV treatment.     OB History    Para Term  AB Problem: NEUROLOGICAL - ADULT  Goal: Achieves stable or improved neurological status  Description: INTERVENTIONS  - Assess for and report changes in neurological status  - Initiate measures to prevent increased intracranial pressure  - Maintain blood press 1 Dg Benítez DISCHARGE:12/7/20  Discharge Summary       Date of Admission: 12/5/2020     Date of Discharge: 12/7/2020      Admitting Diagnosis: Preeclampsia in postpartum period [O14.95]     Disposition: Home     Discharge Diagnosis: . Principal Problem Freq: Every 6 hours PRN Route: OR  PRN Reason: moderate pain  Start: 12/06/20 0710 End: 12/07/20 1322            [id number]     [id number]         ibuprofen (MOTRIN) tab 600 mg   Dose: 600 mg  Freq: Every 6 hours PRN Route: OR  PRN Reasons: mild pain,Headaches  St

## 2020-12-10 ENCOUNTER — NURSE ONLY (OUTPATIENT)
Dept: LACTATION | Facility: HOSPITAL | Age: 30
End: 2020-12-10
Attending: OBSTETRICS & GYNECOLOGY
Payer: COMMERCIAL

## 2020-12-10 DIAGNOSIS — O92.79 DISORDER OF LACTATION, POSTPARTUM CONDITION OR COMPLICATION: Primary | ICD-10-CM

## 2020-12-10 PROCEDURE — 99213 OFFICE O/P EST LOW 20 MIN: CPT

## 2020-12-10 NOTE — PROGRESS NOTES
LACTATION NOTE - MOTHER      Evaluation Type: Outpatient Initial    Problems identified  Problems identified: Knowledge deficit;Multiple birth;Milk supply not WNL  Milk supply not WNL: Reduced (potential)    Maternal history  Maternal history: PIH; Infertil Debra- Facial asymmetry noted. Oral assessment- Gonzalo Aguilar shows poor tongue elevation when crying, palpated tight thick lingual frenulum. During suck assessment, tongue had difficulty cupping finger, weaker uncoordinated suck.  Feeding assessment- Gonzalo Aguilar

## 2020-12-11 ENCOUNTER — TELEPHONE (OUTPATIENT)
Dept: OBGYN UNIT | Facility: HOSPITAL | Age: 30
End: 2020-12-11

## 2020-12-16 ENCOUNTER — TELEPHONE (OUTPATIENT)
Dept: OBGYN UNIT | Facility: HOSPITAL | Age: 30
End: 2020-12-16

## 2021-01-08 PROBLEM — O30.009 TWIN DELIVERY BY C-SECTION: Status: RESOLVED | Noted: 2020-11-30 | Resolved: 2021-01-08

## 2021-01-08 PROBLEM — O30.001 TWIN GESTATION IN FIRST TRIMESTER (HCC): Status: RESOLVED | Noted: 2020-05-04 | Resolved: 2021-01-08

## 2021-01-08 PROBLEM — O30.001 TWIN GESTATION IN FIRST TRIMESTER: Status: RESOLVED | Noted: 2020-05-04 | Resolved: 2021-01-08

## 2021-01-08 PROBLEM — O36.8190 DECREASED FETAL MOVEMENT: Status: RESOLVED | Noted: 2020-10-30 | Resolved: 2021-01-08

## 2021-01-08 PROBLEM — O30.009 TWIN DELIVERY BY C-SECTION (HCC): Status: RESOLVED | Noted: 2020-11-30 | Resolved: 2021-01-08

## 2021-01-08 PROBLEM — O30.049 DICHORIONIC DIAMNIOTIC TWIN PREGNANCY, ANTEPARTUM: Status: RESOLVED | Noted: 2020-07-28 | Resolved: 2021-01-08

## 2021-01-08 PROBLEM — O36.8190 DECREASED FETAL MOVEMENT (HCC): Status: RESOLVED | Noted: 2020-10-30 | Resolved: 2021-01-08

## 2021-01-08 PROBLEM — O30.049 DICHORIONIC DIAMNIOTIC TWIN PREGNANCY, ANTEPARTUM (HCC): Status: RESOLVED | Noted: 2020-07-28 | Resolved: 2021-01-08

## 2021-02-05 ENCOUNTER — APPOINTMENT (OUTPATIENT)
Dept: MRI IMAGING | Facility: HOSPITAL | Age: 31
End: 2021-02-05
Attending: EMERGENCY MEDICINE
Payer: COMMERCIAL

## 2021-02-05 ENCOUNTER — HOSPITAL ENCOUNTER (EMERGENCY)
Facility: HOSPITAL | Age: 31
Discharge: HOME | End: 2021-02-05
Attending: EMERGENCY MEDICINE
Payer: COMMERCIAL

## 2021-02-05 VITALS
TEMPERATURE: 98 F | BODY MASS INDEX: 27.32 KG/M2 | HEIGHT: 66 IN | SYSTOLIC BLOOD PRESSURE: 117 MMHG | DIASTOLIC BLOOD PRESSURE: 78 MMHG | WEIGHT: 170 LBS | HEART RATE: 76 BPM | OXYGEN SATURATION: 98 % | RESPIRATION RATE: 20 BRPM

## 2021-02-05 DIAGNOSIS — M54.12 CERVICAL RADICULOPATHY: Primary | ICD-10-CM

## 2021-02-05 LAB
ANION GAP SERPL CALC-SCNC: 5 MMOL/L (ref 0–18)
BASOPHILS # BLD AUTO: 0.02 X10(3) UL (ref 0–0.2)
BASOPHILS NFR BLD AUTO: 0.4 %
BUN BLD-MCNC: 20 MG/DL (ref 7–18)
BUN/CREAT SERPL: 28.6 (ref 10–20)
CALCIUM BLD-MCNC: 9.1 MG/DL (ref 8.5–10.1)
CHLORIDE SERPL-SCNC: 107 MMOL/L (ref 98–112)
CO2 SERPL-SCNC: 27 MMOL/L (ref 21–32)
CREAT BLD-MCNC: 0.7 MG/DL
D DIMER PPP FEU-MCNC: 0.35 UG/ML FEU (ref ?–0.5)
DEPRECATED RDW RBC AUTO: 38.8 FL (ref 35.1–46.3)
EOSINOPHIL # BLD AUTO: 0.16 X10(3) UL (ref 0–0.7)
EOSINOPHIL NFR BLD AUTO: 3.5 %
ERYTHROCYTE [DISTWIDTH] IN BLOOD BY AUTOMATED COUNT: 13.5 % (ref 11–15)
GLUCOSE BLD-MCNC: 97 MG/DL (ref 70–99)
HCT VFR BLD AUTO: 36.9 %
HGB BLD-MCNC: 11.5 G/DL
IMM GRANULOCYTES # BLD AUTO: 0 X10(3) UL (ref 0–1)
IMM GRANULOCYTES NFR BLD: 0 %
LYMPHOCYTES # BLD AUTO: 1.46 X10(3) UL (ref 1–4)
LYMPHOCYTES NFR BLD AUTO: 31.9 %
MCH RBC QN AUTO: 24.6 PG (ref 26–34)
MCHC RBC AUTO-ENTMCNC: 31.2 G/DL (ref 31–37)
MCV RBC AUTO: 79 FL
MONOCYTES # BLD AUTO: 0.39 X10(3) UL (ref 0.1–1)
MONOCYTES NFR BLD AUTO: 8.5 %
NEUTROPHILS # BLD AUTO: 2.55 X10 (3) UL (ref 1.5–7.7)
NEUTROPHILS # BLD AUTO: 2.55 X10(3) UL (ref 1.5–7.7)
NEUTROPHILS NFR BLD AUTO: 55.7 %
OSMOLALITY SERPL CALC.SUM OF ELEC: 291 MOSM/KG (ref 275–295)
PLATELET # BLD AUTO: 272 10(3)UL (ref 150–450)
POTASSIUM SERPL-SCNC: 3.8 MMOL/L (ref 3.5–5.1)
RBC # BLD AUTO: 4.67 X10(6)UL
SODIUM SERPL-SCNC: 139 MMOL/L (ref 136–145)
WBC # BLD AUTO: 4.6 X10(3) UL (ref 4–11)

## 2021-02-05 PROCEDURE — 80048 BASIC METABOLIC PNL TOTAL CA: CPT | Performed by: EMERGENCY MEDICINE

## 2021-02-05 PROCEDURE — 72141 MRI NECK SPINE W/O DYE: CPT | Performed by: EMERGENCY MEDICINE

## 2021-02-05 PROCEDURE — 85025 COMPLETE CBC W/AUTO DIFF WBC: CPT | Performed by: EMERGENCY MEDICINE

## 2021-02-05 PROCEDURE — 99285 EMERGENCY DEPT VISIT HI MDM: CPT

## 2021-02-05 PROCEDURE — 85379 FIBRIN DEGRADATION QUANT: CPT | Performed by: EMERGENCY MEDICINE

## 2021-02-05 PROCEDURE — 70551 MRI BRAIN STEM W/O DYE: CPT | Performed by: EMERGENCY MEDICINE

## 2021-02-05 PROCEDURE — 96374 THER/PROPH/DIAG INJ IV PUSH: CPT

## 2021-02-05 RX ORDER — METHYLPREDNISOLONE 4 MG/1
TABLET ORAL
Qty: 1 PACKAGE | Refills: 0 | Status: SHIPPED | OUTPATIENT
Start: 2021-02-05 | End: 2021-09-08

## 2021-02-05 RX ORDER — LORAZEPAM 2 MG/ML
1 INJECTION INTRAMUSCULAR ONCE
Status: COMPLETED | OUTPATIENT
Start: 2021-02-05 | End: 2021-02-05

## 2021-02-05 RX ORDER — CYCLOBENZAPRINE HCL 10 MG
10 TABLET ORAL EVERY 8 HOURS PRN
Qty: 15 TABLET | Refills: 0 | Status: SHIPPED | OUTPATIENT
Start: 2021-02-05 | End: 2021-09-08

## 2021-02-06 NOTE — ED PROVIDER NOTES
Patient Seen in: [facility] Emergency Department      History   Patient presents with:  Numbness Weakness    Stated Complaint: Partial Eclampsia 2 mo post partum sent by [facility]    HPI/Subjective:   HPI    The patient is a 25-year-old female who is 2 Smokeless tobacco: Never Used    Alcohol use: Not Currently    Drug use: Never             Review of Systems    Positive for stated complaint: Partial Eclampsia 2 mo post partum sent by I.C. Other systems are as noted in HPI.   Constitutional and vital Back:    Lymphadenopathy:      Cervical: No cervical adenopathy. Skin:     General: Skin is warm and dry. Capillary Refill: Capillary refill takes less than 2 seconds. Findings: No erythema or rash.    Neurological:      Mental Status: She Case discussed with Dr. Naya Morris from neurology who recommended MRI brain and MRI cervical spine. He agrees that this is likely cervical radiculopathy and if brain and C-spine is normal she can follow-up with neurology and primary for further management.   P

## 2021-02-06 NOTE — ED INITIAL ASSESSMENT (HPI)
Pt to er with c/o right shoulder pain/weakness that started last Wednesday. Pt states now when she takes a deep breath she is having a stabbing pain that makes it difficult to inhale.  Then, patient started experiencing some slurred speech and right arm wea

## 2021-11-16 PROBLEM — F32.1 CURRENT MODERATE EPISODE OF MAJOR DEPRESSIVE DISORDER WITHOUT PRIOR EPISODE (HCC): Status: ACTIVE | Noted: 2021-11-16

## 2022-09-05 ENCOUNTER — HOSPITAL ENCOUNTER (OUTPATIENT)
Age: 32
Discharge: HOME | End: 2022-09-05
Attending: EMERGENCY MEDICINE
Payer: COMMERCIAL

## 2022-09-05 VITALS
RESPIRATION RATE: 20 BRPM | SYSTOLIC BLOOD PRESSURE: 142 MMHG | WEIGHT: 179.88 LBS | TEMPERATURE: 99 F | DIASTOLIC BLOOD PRESSURE: 80 MMHG | OXYGEN SATURATION: 95 % | BODY MASS INDEX: 28 KG/M2 | HEART RATE: 128 BPM

## 2022-09-05 DIAGNOSIS — U07.1 COVID: Primary | ICD-10-CM

## 2022-09-05 LAB — SARS-COV-2 RNA RESP QL NAA+PROBE: DETECTED

## 2022-09-05 PROCEDURE — 99213 OFFICE O/P EST LOW 20 MIN: CPT

## 2024-03-09 ENCOUNTER — HOSPITAL ENCOUNTER (OUTPATIENT)
Age: 34
Discharge: HOME | End: 2024-03-09
Payer: COMMERCIAL

## 2024-03-09 VITALS
OXYGEN SATURATION: 99 % | TEMPERATURE: 98 F | RESPIRATION RATE: 20 BRPM | DIASTOLIC BLOOD PRESSURE: 70 MMHG | HEART RATE: 95 BPM | SYSTOLIC BLOOD PRESSURE: 126 MMHG

## 2024-03-09 DIAGNOSIS — J02.9 ACUTE PHARYNGITIS, UNSPECIFIED ETIOLOGY: Primary | ICD-10-CM

## 2024-03-09 LAB — S PYO AG THROAT QL IA.RAPID: NEGATIVE

## 2024-03-09 PROCEDURE — 99213 OFFICE O/P EST LOW 20 MIN: CPT

## 2024-03-09 PROCEDURE — 87651 STREP A DNA AMP PROBE: CPT | Performed by: PHYSICIAN ASSISTANT

## 2024-03-09 RX ORDER — IBUPROFEN 600 MG/1
TABLET ORAL
Qty: 20 TABLET | Refills: 0 | Status: SHIPPED | OUTPATIENT
Start: 2024-03-09

## 2024-03-09 NOTE — ED INITIAL ASSESSMENT (HPI)
Patient with sore throat starting on Thursday.  Friday she developed an uncomfortable stomach.  Negative covid tests at home x 2.

## 2024-03-09 NOTE — ED PROVIDER NOTES
Patient Seen in: [facility] [place]    History     Chief Complaint   Patient presents with    Sore Throat     Stated Complaint: Sore Throat    HPI    Nadiya Mares is a 33 year old female presents with chief complaint of sore throat.  Onset 2 days ago.  Patient reports associated \"gurgling\" stomach.  Patient denies abdominal pain.  Patient states they are tolerating solid food and oral liquids.  Patient denies fever, chills, earache, trismus, drooling, neck pain, restricted neck movement, neck swelling, rash, cough, dyspnea, wheeze, abdominal pain, nausea, vomiting, diarrhea, constipation, melena, hematochezia, dysuria, flank pain, vaginal bleeding, vaginal discharge.      Past Medical History:   Diagnosis Date    Current moderate episode of major depressive disorder without prior episode ([facility]) 2021    Disorder of lactation, postpartum condition or complication ([facility])     Infertility, female     3 IUIs, 1 miscarriage    Preeclampsia in postpartum period (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  Constitutional and vital signs reviewed.      All other systems reviewed and negative except as noted above.    PSFH elements reviewed from today and agreed except as otherwise stated in  HPI.    Physical Exam     ED Triage Vitals [03/09/24 0824]   /70   Pulse 95   Resp 20   Temp 97.8 °F (36.6 °C)   Temp src Temporal   SpO2 99 %   O2 Device None (Room air)       Current:/70   Pulse 95   Temp 97.8 °F (36.6 °C) (Temporal)   Resp 20   SpO2 99%     PULSE OX within normal limits on room air as interpreted by this provider.     Constitutional: The patient is cooperative. Appears well-developed and well-nourished.  Mild discomfort.  Psychological: Alert, No abnormalities of mood, affect.  Head: Normocephalic/atraumatic.  Nontender.  Eyes: Pupils are equal round reactive to light.  Conjunctiva are within normal limits.  ENT: Pharynx injected.  Tonsils surgically absent bilaterally.  No exudates.  Uvula midline.  No trismus.  No drooling.  TMs within normal limits bilaterally.  Mucous membranes moist.  Neck: The neck is supple.  Nontender.  No meningeal signs.  Chest: There is no tenderness to the chest wall.  No CVA tenderness bilaterally.  Respiratory: Respiratory effort was normal.  There is no stridor.    Cardiovascular: Regular rate and rhythm.  Capillary refill is brisk.    Gastrointestinal: Abdomen soft, nontender, nondistended.  There is no rebound tenderness or guarding.  No organomegaly is noted. No peritoneal signs.  Genitourinary: Not examined.  Lymphatic: No gross lymphadenopathy noted.  Musculoskeletal: Musculoskeletal system is grossly intact.  There is no obvious deformity.  Neurological: Gross motor movement is intact in all 4 extremities.  Patient exhibits normal speech.  Skin: Skin is normal to inspection.  Warm and dry.  No obvious bruising.  No obvious rash.        ED Course     Labs Reviewed   RAPID STREP A - Normal       MDM     Differential diagnosis prior to work-up including but not limited to strep pharyngitis, viral pharyngitis, URI    Physical exam remained stable over serial reexaminations as previously documented.  Results reviewed with patient.    I have given  the patient instructions regarding their diagnoses, expectations, follow up, and ER precautions. I explained to the patient that emergent conditions may arise and to go to the ER for new, worsening or any persistent conditions. I've explained the importance of following up with their doctor as instructed. The patient verbalized understanding of the discharge instructions and plan.      Disposition and Plan     Clinical Impression:  1. Acute pharyngitis, unspecified etiology        Disposition:  Discharge    Follow-up:  Yin Solis MD  [facility] [place] [address]  [phone]    Call in 1 day  For follow-up      Medications Prescribed:  Discharge Medication List as of 3/9/2024  8:37 AM        START taking these medications    Details   ibuprofen 600 MG Oral Tab Take 1 tablet (600 mg total) by mouth every 6 hours with food, Normal, Disp-20 tablet, R-0      phenol 1.4 % Mouth/Throat Liquid Use as directed 1 mL in the mouth or throat every 2 (two) hours as needed. For 5 days, Normal, Disp-177 mL, R-0

## 2024-12-19 ENCOUNTER — HOSPITAL ENCOUNTER (OUTPATIENT)
Age: 34
Discharge: HOME | End: 2024-12-19
Payer: COMMERCIAL

## 2024-12-19 VITALS
HEART RATE: 96 BPM | TEMPERATURE: 99 F | DIASTOLIC BLOOD PRESSURE: 68 MMHG | RESPIRATION RATE: 12 BRPM | SYSTOLIC BLOOD PRESSURE: 121 MMHG | OXYGEN SATURATION: 97 %

## 2024-12-19 DIAGNOSIS — B97.89 ACUTE VIRAL SINUSITIS: Primary | ICD-10-CM

## 2024-12-19 DIAGNOSIS — J01.90 ACUTE VIRAL SINUSITIS: Primary | ICD-10-CM

## 2024-12-19 LAB
POCT INFLUENZA A: NEGATIVE
POCT INFLUENZA B: NEGATIVE
S PYO AG THROAT QL IA.RAPID: NEGATIVE
SARS-COV-2 RNA RESP QL NAA+PROBE: NOT DETECTED

## 2024-12-19 PROCEDURE — 99212 OFFICE O/P EST SF 10 MIN: CPT

## 2024-12-19 PROCEDURE — 87502 INFLUENZA DNA AMP PROBE: CPT | Performed by: PHYSICIAN ASSISTANT

## 2024-12-19 PROCEDURE — 87651 STREP A DNA AMP PROBE: CPT | Performed by: PHYSICIAN ASSISTANT

## 2024-12-19 PROCEDURE — 99213 OFFICE O/P EST LOW 20 MIN: CPT

## 2024-12-19 RX ORDER — CABERGOLINE 0.5 MG/1
0.5 TABLET ORAL
COMMUNITY
Start: 2024-12-16 | End: 2025-12-16

## 2024-12-19 NOTE — ED PROVIDER NOTES
Chief Complaint   Patient presents with    Cough/URI       History obtained from: patient   services not used     HPI:     Nadiya Mares is a 34 year old female who presents with general illness symptoms x 4 days.  Patient endorses cough, nasal congestion, sinus pressure, sore throat, and ear pressure.  Patient states her twin 4-year-old children are also sick with similar symptoms.  Patient continues to eat and drink normally.  Denies fever, chest pain, shortness of breath, wheezing, abdominal pain, vomiting, neck stiffness, rash.    PMH  Past Medical History:    Current moderate episode of major depressive disorder without prior episode ([facility])    Disorder of lactation, postpartum condition or complication ([facility])    Infertility, female    3 IUIs, 1 miscarriage    Preeclampsia in postpartum period ([facility])       PFSH    PFS asessment screens reviewed and agree.  Nurses notes reviewed I agree with documentation.    Family History   Problem Relation Age of Onset    No Known Problems Mother     No Known Problems Father     No Known Problems Brother      Family history reviewed with patient/caregiver and is not pertinent to presenting problem.  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  Constitutional and Vital Signs Reviewed.    Physical Exam:     Findings:    /68   Pulse 96   Temp 98.7 °F (37.1 °C) (Oral)   Resp 12   SpO2 97%   GENERAL: well developed, no acute distress, non-toxic appearing   SKIN: good skin turgor, no obvious rashes  HEAD: normocephalic, atraumatic  EYES: sclera non-icteric bilaterally, conjunctiva clear bilaterally  EARS: canals clear bilaterally, TMs clear bilaterally  NOSE: nasal congestion, bilateral maxillary sinus tenderness  OROPHARYNX: MMM, pharynx clear, no exudates or swelling, uvula midline, no tongue elevation, maintaining airway and secretions  NECK: supple, no lymphadenopathy, no nuchal rigidity, no trismus, no edema, phonation normal    CARDIO: RRR, normal heart sounds   LUNGS: clear to auscultation bilaterally, no increased WOB, no rales, rhonchi, or wheezes  EXTREMITIES: no cyanosis or edema, BALBUENA without difficulty  NEURO: no focal deficits  PSYCH: alert and oriented x3, answering questions appropriately, mood appropriate    MDM/Assessment/Plan:   Orders for this encounter:    Orders Placed This Encounter    Rapid Strep A - ID NOW     Order Specific Question:   Release to patient     Answer:   Immediate    POCT Flu Test     Order Specific Question:   Release to patient     Answer:   Immediate    Rapid SARS-CoV-2 by PCR     Order Specific Question:   Release to patient     Answer:   Immediate       Labs performed this visit:  Recent Results (from the past 10 hours)   Rapid Strep A - ID NOW    Collection Time: 12/19/24  8:18 AM    Specimen: Throat; Other   Result Value Ref Range    Strep A by PCR Negative Negative   POCT Flu Test    Collection Time: 12/19/24  8:18 AM    Specimen: Nares; Other   Result Value  Ref Range    POCT INFLUENZA A Negative Negative    POCT INFLUENZA B Negative Negative   Rapid SARS-CoV-2 by PCR    Collection Time: 12/19/24  8:18 AM    Specimen: Nares; Other   Result Value Ref Range    Rapid SARS-CoV-2 by PCR Not Detected Not Detected       Imaging performed this visit:  No orders to display       Medical Decision Making  DDx includes viral sinusitis versus covid versus flu versus strep pharyngitis versus other. Patient is overall well-appearing with stable vitals and tolerating oral intake. No hypoxia or signs of respiratory distress. Covid, flu, and strep tests negative. Discussed supportive care for suspected viral illness including rest, increased fluid intake, using a humidifier, and OTC Tylenol/Motrin as needed for pain or fevers.  Discussed infection control.  Instructed patient to go directly to nearest ER with any worsening or concerning symptoms.  Follow-up with PCP.    Amount and/or Complexity of Data Reviewed  Labs: ordered.    Risk  OTC drugs.          Diagnosis:    ICD-10-CM    1. Acute viral sinusitis  J01.90     B97.89           All results reviewed and discussed with patient/patient's family. Patient/patient's family verbalize excellent understanding of instructions and feels comfortable with plan. All of patient's/patient's family's questions were addressed.   See AVS for detailed discharge instructions for your condition today.    Follow Up with:  Yin Solis MD  [address] [facility]  [address] [place] [address] [phone]    Schedule an appointment as soon as possible for a visit         Note: This document was dictated using Dragon medical dictation software.  Proofreading was performed to the best of my ability, but errors may be present.    Tami Elam PA-C

## 2024-12-19 NOTE — ED INITIAL ASSESSMENT (HPI)
Patient with productive cough, sinus pain and pressure, ear congestion since Sunday.  Denies fevers.  Children also ill.

## 2025-02-07 ENCOUNTER — HOSPITAL ENCOUNTER (OUTPATIENT)
Age: 35
Discharge: HOME | End: 2025-02-07
Payer: COMMERCIAL

## 2025-02-07 VITALS
HEART RATE: 98 BPM | TEMPERATURE: 98 F | RESPIRATION RATE: 18 BRPM | SYSTOLIC BLOOD PRESSURE: 129 MMHG | OXYGEN SATURATION: 98 % | DIASTOLIC BLOOD PRESSURE: 82 MMHG

## 2025-02-07 DIAGNOSIS — N30.00 ACUTE CYSTITIS WITHOUT HEMATURIA: Primary | ICD-10-CM

## 2025-02-07 LAB
B-HCG UR QL: NEGATIVE
BILIRUB UR QL STRIP: NEGATIVE
COLOR UR: YELLOW
GLUCOSE UR STRIP-MCNC: NEGATIVE MG/DL
KETONES UR STRIP-MCNC: NEGATIVE MG/DL
NITRITE UR QL STRIP: NEGATIVE
PH UR STRIP: 6 [PH]
PROT UR STRIP-MCNC: NEGATIVE MG/DL
SP GR UR STRIP: 1.02
UROBILINOGEN UR STRIP-ACNC: <2 MG/DL

## 2025-02-07 PROCEDURE — 87086 URINE CULTURE/COLONY COUNT: CPT | Performed by: PHYSICIAN ASSISTANT

## 2025-02-07 PROCEDURE — 99214 OFFICE O/P EST MOD 30 MIN: CPT

## 2025-02-07 PROCEDURE — 87077 CULTURE AEROBIC IDENTIFY: CPT | Performed by: PHYSICIAN ASSISTANT

## 2025-02-07 PROCEDURE — 87186 SC STD MICRODIL/AGAR DIL: CPT | Performed by: PHYSICIAN ASSISTANT

## 2025-02-07 PROCEDURE — 81025 URINE PREGNANCY TEST: CPT

## 2025-02-07 PROCEDURE — 81002 URINALYSIS NONAUTO W/O SCOPE: CPT

## 2025-02-07 RX ORDER — CEFADROXIL 500 MG/1
500 CAPSULE ORAL 2 TIMES DAILY
Qty: 20 CAPSULE | Refills: 0 | Status: SHIPPED | OUTPATIENT
Start: 2025-02-07 | End: 2025-02-17

## 2025-02-07 NOTE — ED INITIAL ASSESSMENT (HPI)
Presents with urinary frequency and \"deep\" pelvic pain for 2 weeks. No dysuria. No fever/chills. Denies vaginal discharge. Started Cabergoline about a month. Used a \"disc\" for the 1st time during her last menstrual period.

## 2025-02-07 NOTE — ED PROVIDER NOTES
Patient Seen in: [facility] [place]      History     Chief Complaint   Patient presents with    Urinary Symptoms     Stated Complaint: frequent urination pain while urinating    Subjective:   HPI    34-year-old female presenting to the immediate care for evaluation of urinary frequency, lower abdominal pressure at the end of stream.  States symptoms for the last 2 weeks.  There is no associated urgency, abdominal pain, back pain.  Patient denies vaginal discharge.  She has not been having any fevers.  Denies any history of UTI.  States these symptoms started after using a menstrual disc for the first time.    Objective:     Past Medical History:    Current moderate episode of major depressive disorder without prior episode ([facility])    Disorder of lactation, postpartum condition or complication ([facility])    Infertility, female    3 IUIs, 1 miscarriage    Preeclampsia in postpartum period ([facility])              Past Surgical History:   Procedure Laterality Date          Egd scope  10/16    Tonsillectomy      [place] teeth removed                  Social History     Socioeconomic History    Marital status:    Tobacco Use    Smoking status: Never    Smokeless tobacco: Never   Vaping Use    Vaping status: Never Used   Substance and Sexual Activity    Alcohol use: Not Currently    Drug use: Never   Social History Narrative    , 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  Constitutional and vital signs reviewed.      All other systems reviewed and negative except as noted above.    Physical Exam     ED Triage Vitals [25 1438]   /82   Pulse 98   Resp 18   Temp 98.1 °F (36.7 °C)   Temp src Oral   SpO2 98 %   O2 Device None (Room air)       Current Vitals:   Vital Signs  BP: 129/82  Pulse: 98  Resp: 18  Temp: 98.1 °F (36.7 °C)  Temp src:  Oral    Oxygen Therapy  SpO2: 98 %  O2 Device: None (Room air)        Physical Exam  Vitals and nursing note reviewed.   Constitutional:       General: She is not in acute distress.  HENT:      Head: Normocephalic and atraumatic.      Right Ear: External ear normal.      Left Ear: External ear normal.      Nose: Nose normal.      Mouth/Throat:      Mouth: Mucous membranes are moist.   Eyes:      Extraocular Movements: Extraocular movements intact.      Pupils: Pupils are equal, round, and reactive to light.   Cardiovascular:      Rate and Rhythm: Normal rate.   Pulmonary:      Effort: Pulmonary effort is normal.   Abdominal:      General: Abdomen is flat.   Musculoskeletal:         General: Normal range of motion.      Cervical back: Normal range of motion.   Skin:     General: Skin is warm.   Neurological:      General: No focal deficit present.      Mental Status: She is alert and oriented to person, place, and time.   Psychiatric:         Mood and Affect: Mood normal.         Behavior: Behavior normal.             ED Course     Labs Reviewed   [facility] POCT URINALYSIS DIPSTICK - Abnormal; Notable for the following components:       Result Value    Urine Clarity Slightly cloudy (*)     Blood, Urine Small (*)     Leukocyte esterase urine Trace (*)     All other components within normal limits   POCT PREGNANCY URINE - Normal   URINE CULTURE, ROUTINE          34-year-old female presents with complaints of urinary frequency, lower abdominal pressure especially at the end of stream    Ddx-UTI, cystitis, pyelonephritis,     Cloudy with small blood, trace leukocyte.  Culture pending.  Will initiate Duricef for treatment of UTI.  PCP follow-up and return precautions advised           MDM              Medical Decision Making      Disposition and Plan     Clinical Impression:  1. Acute cystitis without hematuria         Disposition:  Discharge  2/7/2025  3:30 pm    Follow-up:  Yin Solis MD  [address] [facility]  [address]  [place] [address]  [phone]                Medications Prescribed:  Discharge Medication List as of 2/7/2025  3:30 PM        START taking these medications    Details   cefadroxil 500 MG Oral Cap Take 1 capsule (500 mg total) by mouth 2 (two) times daily for 10 days., Normal, Disp-20 capsule, R-0                 Supplementary Documentation:

## (undated) NOTE — LETTER
[address]      Authorization for Surgical Operation and Procedure     Date:11/30/20___________                                                                                                         Time 4.   Should the need arise during my operation or immediate post-operative period, I also consent to the administration of blood and/or blood products.   Further, I understand that despite careful testing and screening of blood or blood products by kellen 8.   I recognize that in the event my procedure results in extended X-Ray/fluoroscopy time, I may develop a skin reaction. 9.  If I have a Do Not Attempt Resuscitation (DNAR) order in place, that status will be suspended while in the operating room, proc STATEMENT OF PHYSICIAN My signature below affirms that prior to the time of the procedure; I have explained to the patient and/or his/her legal representative, the risks and benefits involved in the proposed treatment and any reasonable alternative to the

## (undated) NOTE — LETTER
Date & Time: 1/23/2019, 3:45 PM  Patient: Daylin Charles  Encounter Provider(s):    Marina Barboza MD       To Whom It May Concern:    Micah Reis was seen and treated in our department on 1/23/2019. She should not return to work until 1/25/19.

## (undated) NOTE — LETTER
[facility]  Administration of Anesthesia  1. I, Phu Cherry, or _________________________________ acting on her behalf, (Patient) (Dependent/Representative) request to receive anesthesia for my pending procedure/operation/treatment. 6. OBSTETRIC PATIENTS: Specific risks/consequences of spinal/epidural anesthesia may include itching, low blood pressure, difficulty urinating, slowing of the baby's heart rate and headache.  Rare risks include infections, high spinal block, spinal bleeding ___________________________________________________           _____________________________________________________  Date/Time                                                                                               Responsible person in case of minor

## (undated) NOTE — ED AVS SNAPSHOT
Orin Morrison   MRN: [id number]    Department:  [facility] Emergency Department   Date of Visit:  4/17/2018           Disclosure     Insurance plans vary and the physician(s) referred by the ER may not be covered by your plan.  Please contac CARE PHYSICIAN AT ONCE OR RETURN IMMEDIATELY TO THE EMERGENCY DEPARTMENT. If you have been prescribed any medication(s), please fill your prescription right away and begin taking the medication(s) as directed.   If you believe that any of the medications

## (undated) NOTE — ED AVS SNAPSHOT
[facility] AND [facility] in [address]    Phone:  [phone]    Fax:  [phone]           Yolis Andie   MRN: [id number]    Department:  [facility] AND [facility] in [address] [place] [address]   Date of Make sure to stay well hydrated with clear fluids. You can use Tylenol or Motrin every 6 hours to control fever or discomfort. You can use both Tylenol and Motrin, but alternate them so that you're getting one every 3 hours.  Warm salt water gargles, throat You were examined and treated today on an urgent basis only. This was not a substitute for ongoing medical care. Often, one Immediate Care visit does not uncover every injury or illness.  If you have been referred to a primary care or a specialist physicia ben. [facility]        Pharmacy Address Phone Number   Mateo [address] [facility] ([address]) [address] [facility] ([address]) [address] [facility] MyChart questions? Call [phone] for help. [email or web address] is NOT to be used for urgent needs. For medical emergencies, dial 911.

## (undated) NOTE — LETTER
Date & Time: 1/23/2019, 3:44 PM  Patient: Ian Wynn  Encounter Provider(s):    German Sherman MD       To Whom It May Concern:    Maryam Jackson was seen and treated in our department on 1/23/2019.  She {Return to school/sport/work:[id number]}